# Patient Record
Sex: MALE | Race: WHITE | NOT HISPANIC OR LATINO | Employment: OTHER | URBAN - METROPOLITAN AREA
[De-identification: names, ages, dates, MRNs, and addresses within clinical notes are randomized per-mention and may not be internally consistent; named-entity substitution may affect disease eponyms.]

---

## 2019-01-17 ENCOUNTER — LAB VISIT (OUTPATIENT)
Dept: LAB | Facility: HOSPITAL | Age: 69
End: 2019-01-17
Attending: NURSE PRACTITIONER
Payer: MEDICARE

## 2019-01-17 DIAGNOSIS — Z12.11 SCREENING FOR COLORECTAL CANCER: ICD-10-CM

## 2019-01-17 DIAGNOSIS — Z12.12 SCREENING FOR COLORECTAL CANCER: ICD-10-CM

## 2019-01-17 LAB — HEMOCCULT STL QL IA: NEGATIVE

## 2019-01-17 PROCEDURE — 82274 ASSAY TEST FOR BLOOD FECAL: CPT

## 2019-04-16 PROBLEM — F17.200 SMOKER: Status: ACTIVE | Noted: 2019-04-16

## 2020-02-04 ENCOUNTER — PATIENT OUTREACH (OUTPATIENT)
Dept: ADMINISTRATIVE | Facility: HOSPITAL | Age: 70
End: 2020-02-04

## 2020-12-02 ENCOUNTER — LAB VISIT (OUTPATIENT)
Dept: LAB | Facility: HOSPITAL | Age: 70
End: 2020-12-02
Attending: NURSE PRACTITIONER
Payer: MEDICARE

## 2020-12-02 DIAGNOSIS — Z00.00 ENCOUNTER FOR PREVENTIVE HEALTH EXAMINATION: ICD-10-CM

## 2020-12-02 PROCEDURE — 82274 ASSAY TEST FOR BLOOD FECAL: CPT

## 2020-12-07 LAB — HEMOCCULT STL QL IA: NEGATIVE

## 2021-04-03 ENCOUNTER — HOSPITAL ENCOUNTER (EMERGENCY)
Facility: HOSPITAL | Age: 71
Discharge: HOME | End: 2021-04-03
Attending: FAMILY MEDICINE
Payer: MEDICARE

## 2021-04-03 VITALS
RESPIRATION RATE: 20 BRPM | BODY MASS INDEX: 37.5 KG/M2 | WEIGHT: 261.38 LBS | HEART RATE: 72 BPM | OXYGEN SATURATION: 98 % | TEMPERATURE: 97 F | SYSTOLIC BLOOD PRESSURE: 132 MMHG | DIASTOLIC BLOOD PRESSURE: 74 MMHG

## 2021-04-03 DIAGNOSIS — R06.02 SOB (SHORTNESS OF BREATH): ICD-10-CM

## 2021-04-03 DIAGNOSIS — Z20.822 COVID-19 VIRUS NOT DETECTED: ICD-10-CM

## 2021-04-03 DIAGNOSIS — F41.9 ANXIETY: Primary | ICD-10-CM

## 2021-04-03 LAB
ALBUMIN SERPL BCP-MCNC: 4 G/DL (ref 3.5–5.2)
ALP SERPL-CCNC: 95 U/L (ref 55–135)
ALT SERPL W/O P-5'-P-CCNC: 43 U/L (ref 10–44)
ANION GAP SERPL CALC-SCNC: 11 MMOL/L (ref 8–16)
AST SERPL-CCNC: 26 U/L (ref 10–40)
BASOPHILS # BLD AUTO: 0.06 K/UL (ref 0–0.2)
BASOPHILS NFR BLD: 0.5 % (ref 0–1.9)
BILIRUB SERPL-MCNC: 0.6 MG/DL (ref 0.1–1)
BNP SERPL-MCNC: 29 PG/ML (ref 0–99)
BUN SERPL-MCNC: 13 MG/DL (ref 8–23)
CALCIUM SERPL-MCNC: 9 MG/DL (ref 8.7–10.5)
CHLORIDE SERPL-SCNC: 103 MMOL/L (ref 95–110)
CO2 SERPL-SCNC: 20 MMOL/L (ref 23–29)
CREAT SERPL-MCNC: 1 MG/DL (ref 0.5–1.4)
DIFFERENTIAL METHOD: ABNORMAL
EOSINOPHIL # BLD AUTO: 0.2 K/UL (ref 0–0.5)
EOSINOPHIL NFR BLD: 2 % (ref 0–8)
ERYTHROCYTE [DISTWIDTH] IN BLOOD BY AUTOMATED COUNT: 13.7 % (ref 11.5–14.5)
EST. GFR  (AFRICAN AMERICAN): >60 ML/MIN/1.73 M^2
EST. GFR  (NON AFRICAN AMERICAN): >60 ML/MIN/1.73 M^2
GLUCOSE SERPL-MCNC: 100 MG/DL (ref 70–110)
HCT VFR BLD AUTO: 45.4 % (ref 40–54)
HGB BLD-MCNC: 15.2 G/DL (ref 14–18)
IMM GRANULOCYTES # BLD AUTO: 0.05 K/UL (ref 0–0.04)
IMM GRANULOCYTES NFR BLD AUTO: 0.5 % (ref 0–0.5)
LYMPHOCYTES # BLD AUTO: 0.7 K/UL (ref 1–4.8)
LYMPHOCYTES NFR BLD: 6.7 % (ref 18–48)
MCH RBC QN AUTO: 29.7 PG (ref 27–31)
MCHC RBC AUTO-ENTMCNC: 33.5 G/DL (ref 32–36)
MCV RBC AUTO: 89 FL (ref 82–98)
MONOCYTES # BLD AUTO: 1 K/UL (ref 0.3–1)
MONOCYTES NFR BLD: 8.6 % (ref 4–15)
NEUTROPHILS # BLD AUTO: 9.1 K/UL (ref 1.8–7.7)
NEUTROPHILS NFR BLD: 81.7 % (ref 38–73)
NRBC BLD-RTO: 0 /100 WBC
PLATELET # BLD AUTO: 194 K/UL (ref 150–450)
PMV BLD AUTO: 9.2 FL (ref 9.2–12.9)
POTASSIUM SERPL-SCNC: 3.8 MMOL/L (ref 3.5–5.1)
PROT SERPL-MCNC: 7.3 G/DL (ref 6–8.4)
RBC # BLD AUTO: 5.12 M/UL (ref 4.6–6.2)
SARS-COV-2 RDRP RESP QL NAA+PROBE: NEGATIVE
SODIUM SERPL-SCNC: 134 MMOL/L (ref 136–145)
TROPONIN I SERPL DL<=0.01 NG/ML-MCNC: 0.05 NG/ML (ref 0–0.03)
TROPONIN I SERPL DL<=0.01 NG/ML-MCNC: 0.07 NG/ML (ref 0–0.03)
WBC # BLD AUTO: 11.08 K/UL (ref 3.9–12.7)

## 2021-04-03 PROCEDURE — 93010 ELECTROCARDIOGRAM REPORT: CPT | Mod: ,,, | Performed by: INTERNAL MEDICINE

## 2021-04-03 PROCEDURE — U0002 COVID-19 LAB TEST NON-CDC: HCPCS | Performed by: FAMILY MEDICINE

## 2021-04-03 PROCEDURE — 63600175 PHARM REV CODE 636 W HCPCS: Performed by: FAMILY MEDICINE

## 2021-04-03 PROCEDURE — 80053 COMPREHEN METABOLIC PANEL: CPT | Performed by: FAMILY MEDICINE

## 2021-04-03 PROCEDURE — 84484 ASSAY OF TROPONIN QUANT: CPT | Performed by: FAMILY MEDICINE

## 2021-04-03 PROCEDURE — 99285 EMERGENCY DEPT VISIT HI MDM: CPT | Mod: 25

## 2021-04-03 PROCEDURE — 25000003 PHARM REV CODE 250: Performed by: FAMILY MEDICINE

## 2021-04-03 PROCEDURE — 93005 ELECTROCARDIOGRAM TRACING: CPT

## 2021-04-03 PROCEDURE — 83880 ASSAY OF NATRIURETIC PEPTIDE: CPT | Performed by: FAMILY MEDICINE

## 2021-04-03 PROCEDURE — 96374 THER/PROPH/DIAG INJ IV PUSH: CPT

## 2021-04-03 PROCEDURE — 36415 COLL VENOUS BLD VENIPUNCTURE: CPT | Performed by: FAMILY MEDICINE

## 2021-04-03 PROCEDURE — 85025 COMPLETE CBC W/AUTO DIFF WBC: CPT | Performed by: FAMILY MEDICINE

## 2021-04-03 PROCEDURE — 96375 TX/PRO/DX INJ NEW DRUG ADDON: CPT

## 2021-04-03 PROCEDURE — 93010 EKG 12-LEAD: ICD-10-PCS | Mod: ,,, | Performed by: INTERNAL MEDICINE

## 2021-04-03 RX ORDER — METHYLPREDNISOLONE SOD SUCC 125 MG
125 VIAL (EA) INJECTION
Status: COMPLETED | OUTPATIENT
Start: 2021-04-03 | End: 2021-04-03

## 2021-04-03 RX ORDER — ASPIRIN 325 MG
325 TABLET ORAL
Status: COMPLETED | OUTPATIENT
Start: 2021-04-03 | End: 2021-04-03

## 2021-04-03 RX ORDER — ALBUTEROL SULFATE 90 UG/1
1-2 AEROSOL, METERED RESPIRATORY (INHALATION) EVERY 6 HOURS PRN
Qty: 18 G | Refills: 1 | Status: SHIPPED | OUTPATIENT
Start: 2021-04-03 | End: 2022-08-18

## 2021-04-03 RX ORDER — LORAZEPAM 2 MG/ML
2 INJECTION INTRAMUSCULAR
Status: COMPLETED | OUTPATIENT
Start: 2021-04-03 | End: 2021-04-03

## 2021-04-03 RX ORDER — ALPRAZOLAM 0.5 MG/1
0.5 TABLET ORAL NIGHTLY PRN
Qty: 15 TABLET | Refills: 0 | Status: SHIPPED | OUTPATIENT
Start: 2021-04-03 | End: 2021-08-19

## 2021-04-03 RX ADMIN — LORAZEPAM 2 MG: 2 INJECTION INTRAMUSCULAR; INTRAVENOUS at 01:04

## 2021-04-03 RX ADMIN — ASPIRIN 325 MG: 325 TABLET ORAL at 01:04

## 2021-04-03 RX ADMIN — METHYLPREDNISOLONE SODIUM SUCCINATE 125 MG: 125 INJECTION, POWDER, FOR SOLUTION INTRAMUSCULAR; INTRAVENOUS at 01:04

## 2021-07-01 ENCOUNTER — PATIENT MESSAGE (OUTPATIENT)
Dept: ADMINISTRATIVE | Facility: OTHER | Age: 71
End: 2021-07-01

## 2022-05-12 ENCOUNTER — HOSPITAL ENCOUNTER (EMERGENCY)
Facility: HOSPITAL | Age: 72
Discharge: HOME | End: 2022-05-12
Attending: STUDENT IN AN ORGANIZED HEALTH CARE EDUCATION/TRAINING PROGRAM
Payer: MEDICARE

## 2022-05-12 VITALS
BODY MASS INDEX: 38.66 KG/M2 | HEART RATE: 95 BPM | OXYGEN SATURATION: 96 % | TEMPERATURE: 97 F | DIASTOLIC BLOOD PRESSURE: 89 MMHG | SYSTOLIC BLOOD PRESSURE: 150 MMHG | RESPIRATION RATE: 20 BRPM | WEIGHT: 269.38 LBS

## 2022-05-12 DIAGNOSIS — M54.9 BACK PAIN, UNSPECIFIED BACK LOCATION, UNSPECIFIED BACK PAIN LATERALITY, UNSPECIFIED CHRONICITY: Primary | ICD-10-CM

## 2022-05-12 PROCEDURE — 25000003 PHARM REV CODE 250: Performed by: STUDENT IN AN ORGANIZED HEALTH CARE EDUCATION/TRAINING PROGRAM

## 2022-05-12 PROCEDURE — 96372 THER/PROPH/DIAG INJ SC/IM: CPT | Performed by: STUDENT IN AN ORGANIZED HEALTH CARE EDUCATION/TRAINING PROGRAM

## 2022-05-12 PROCEDURE — 99284 EMERGENCY DEPT VISIT MOD MDM: CPT | Mod: 25

## 2022-05-12 PROCEDURE — 63600175 PHARM REV CODE 636 W HCPCS: Performed by: STUDENT IN AN ORGANIZED HEALTH CARE EDUCATION/TRAINING PROGRAM

## 2022-05-12 RX ORDER — LIDOCAINE 50 MG/G
1 PATCH TOPICAL DAILY
Qty: 10 PATCH | Refills: 0 | Status: SHIPPED | OUTPATIENT
Start: 2022-05-12 | End: 2023-05-19

## 2022-05-12 RX ORDER — LIDOCAINE 50 MG/G
1 PATCH TOPICAL
Status: DISCONTINUED | OUTPATIENT
Start: 2022-05-12 | End: 2022-05-12 | Stop reason: HOSPADM

## 2022-05-12 RX ORDER — KETOROLAC TROMETHAMINE 30 MG/ML
15 INJECTION, SOLUTION INTRAMUSCULAR; INTRAVENOUS
Status: COMPLETED | OUTPATIENT
Start: 2022-05-12 | End: 2022-05-12

## 2022-05-12 RX ORDER — CYCLOBENZAPRINE HCL 10 MG
10 TABLET ORAL
Status: COMPLETED | OUTPATIENT
Start: 2022-05-12 | End: 2022-05-12

## 2022-05-12 RX ADMIN — CYCLOBENZAPRINE HYDROCHLORIDE 10 MG: 10 TABLET, FILM COATED ORAL at 09:05

## 2022-05-12 RX ADMIN — LIDOCAINE 1 PATCH: 50 PATCH TOPICAL at 09:05

## 2022-05-12 RX ADMIN — KETOROLAC TROMETHAMINE 15 MG: 30 INJECTION, SOLUTION INTRAMUSCULAR at 09:05

## 2022-05-12 NOTE — ED TRIAGE NOTES
71 y.o. male presents to ER Room/bed info not found   Chief Complaint   Patient presents with    Back Pain     Lower back pain X 3 days , denies trauma    . No acute distress noted.

## 2022-05-12 NOTE — DISCHARGE INSTRUCTIONS
Please follow up with your primary care physician within 2 days. Ensure that you review all lab work results and/or imaging results. If you have any questions about your discharge paperwork please call the Emergency Department.     Return to the Emergency Department for any numbness, tingling, weakness, worsening pain, fever, numbness to your groin, urinary or bowel incontinence or retention, or any new or worsening symptoms.     Thank you for visiting [facility], Department of Emergency Medicine. Please see the entirety of the educational materials provided. Please note that a visit to the emergency department does not substitute ongoing care from a primary medical provider or specialist. Please ensure to follow up as recommended. However, please return to the emergency department immediately if symptoms do not improve as discussed, symptoms worsen, new symptoms develop, difficulty in following up or for any of your concerns or issues. Please note on discharge you are acknowledging understanding and agreement on medical evaluation, management recommendations and follow up recommendations.

## 2022-05-12 NOTE — ED PROVIDER NOTES
"Encounter Date: 2022       History     Chief Complaint   Patient presents with    Back Pain     Lower back pain X 3 days , denies trauma      Mr. Kruse is a 71 year old man who presents today for low back pain for the past 3 days. Patient reports that he has had back pain "for years" but generally can manage symptoms with lidocaine patches. Patient describes pain as 10/10, sharp and tight. Patient denies radiation to legs, bladder issue, or point tenderness. It is exacerbated by standing and sitting and relieved by laying down. Minimal relief from lidocaine patch, patient states "it's better than nothing". 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 Patient denies radiation to legs, bladder issue, or point tenderness. Patient medical history significant for cataracts.           Review of patient's allergies indicates:  No Known Allergies  Past Medical History:   Diagnosis Date    Back pain     BPH (benign prostatic hypertrophy) with urinary obstruction 2015    Cataract     Enlargement (benign) of prostate     Hernia, hiatal     High cholesterol     Hypertension     Numbness in right leg     Renal cyst     Ruptured disk     Urinary incontinence 2015     Past Surgical History:   Procedure Laterality Date    COLONOSCOPY      LASER OF PROSTATE W/ GREEN LIGHT PVP  2015    MOUTH SURGERY      yag laser       Family History   Problem Relation Age of Onset    Hypertension Mother     Hypertension Father     No Known Problems Sister     No Known Problems Paternal Grandmother     No Known Problems Paternal Grandfather     No Known Problems Maternal Grandmother     No Known Problems Maternal Grandfather      Social History     Tobacco Use    Smoking status: Former Smoker     Packs/day: 0.25     Years: 30.00     Pack years: 7.50     Types: Cigarettes     Quit date: 2021     Years since quittin.7    Smokeless tobacco: Former User    " Tobacco comment: Pt states he is interested in quitting because of cost   Substance Use Topics    Alcohol use: Yes     Alcohol/week: 12.0 standard drinks     Types: 12 Cans of beer per week     Comment: occasional    Drug use: No     Review of Systems   Constitutional: Negative for fever.   HENT: Negative for sore throat.    Respiratory: Negative for shortness of breath.    Cardiovascular: Negative for chest pain.   Gastrointestinal: Negative for abdominal pain, diarrhea, nausea and vomiting.   Genitourinary: Negative for dysuria.   Musculoskeletal: Positive for back pain.   Skin: Negative for rash.   Neurological: Negative for weakness.   Hematological: Does not bruise/bleed easily.       Physical Exam     Initial Vitals [05/12/22 0922]   BP Pulse Resp Temp SpO2   (!) 150/89 95 20 97 °F (36.1 °C) 96 %      MAP       --         Physical Exam    Nursing note and vitals reviewed.  Constitutional: He appears well-developed.   Eyes: EOM are normal.   Neck:   Normal range of motion.  Cardiovascular:   No murmur heard.  Pulmonary/Chest: No respiratory distress.   Abdominal: He exhibits no distension.   Musculoskeletal:         General: Normal range of motion.      Cervical back: Normal range of motion.      Comments: No point tenderness ilicited to palpation of lumbar and thoracic/lumbar spinous processes, transverse processes, or paraspinal musculature. Strength 5/5 and sensation appropriate in lower extremities. Lower extremity reflexes intact.       Neurological: He is alert.   Skin: Skin is warm.   Psychiatric: He has a normal mood and affect.         ED Course   Procedures  Labs Reviewed - No data to display       Imaging Results          X-Ray Lumbar Spine Ap And Lateral (Final result)  Result time 05/12/22 09:58:10    Final result by Ledy Angulo MD (05/12/22 09:58:10)                 Impression:      As above.      Electronically signed by: Ledy Angulo MD  Date:    05/12/2022  Time:    09:58              Narrative:    EXAMINATION:  XR LUMBAR SPINE AP AND LATERAL    CLINICAL HISTORY:  lower back pain;    TECHNIQUE:  AP, lateral and spot images were performed of the lumbar spine.    COMPARISON:  06/03/2013    FINDINGS:  Alignment: Slight straightening of the normal lumbar lordosis.    Vertebrae: Vertebral body heights are maintained.  No suspicious appearing lytic or blastic lesions.    Discs and facets: Multilevel disc space narrowing with endplate sclerosis and marginal osteophyte formation.  Prominent facet arthropathy at L4-5 and L5-S1.    Miscellaneous: Vascular calcifications.                                 Medications   LIDOcaine 5 % patch 1 patch (1 patch Transdermal Patch Applied 5/12/22 0957)   ketorolac injection 15 mg (15 mg Intramuscular Given 5/12/22 0957)   cyclobenzaprine tablet 10 mg (10 mg Oral Given 5/12/22 0956)     Medical Decision Making:   Differential Diagnosis:   DDX: Back pain - In review of history and physical there are no red flags for serious illness. There is no history of IVDU,  fever, chills, focal weakness, numbness, tingling,  symptoms, or immunocompromise.There is a normal neuro exam including equal strength and sensation. Patient is not elderly. Pain likely MSK in nature.  Will provide pain control, reassurance and reassess. Unlikely fracture given no midline TTP/stepoffs. Unlikely cauda equina given no neurological symptoms, urinary/bowel incontinence, or risk factors. Unlikely pyelonephritis or UTI as no CVAT, fever, or urinary symptoms. More likely contusion vs. muscle strain given history, exam.  DX: XR.  TX: Analgesia PRN.  DISPO: If symptoms controlled, likely discharge to follow up with PMD within 2 days with precautions for RTED and supportive care recommendations.                ED Course as of 05/12/22 1058   Thu May 12, 2022   1014 Patient reports significant improvement in pain.  Will discharge. [NB]      ED Course User Index  [NB] Fabio Myers MD              Clinical Impression:   Final diagnoses:  [M54.9] Back pain, unspecified back location, unspecified back pain laterality, unspecified chronicity (Primary)          ED Disposition Condition    Discharge Stable        ED Prescriptions     Medication Sig Dispense Start Date End Date Auth. Provider    LIDOcaine (LIDODERM) 5 % Place 1 patch onto the skin once daily. Remove & Discard patch within 12 hours or as directed by MD 10 patch 5/12/2022  Fabio Myers MD        Follow-up Information     Follow up With Specialties Details Why Contact Info    Jannette Rojas NP Internal Medicine Schedule an appointment as soon as possible for a visit in 2 days  [address] [facility] [id number]  [phone]      [facility] - Emergency Dept Emergency Medicine  If symptoms worsen [address] [facility] [address]  [phone]           Fabio Myers MD  05/12/22 1001       Fabio Myers MD  05/12/22 1055

## 2024-03-20 ENCOUNTER — OFFICE VISIT (OUTPATIENT)
Dept: CARDIOLOGY | Facility: CLINIC | Age: 74
End: 2024-03-20
Payer: MEDICARE

## 2024-03-20 VITALS
BODY MASS INDEX: 40.66 KG/M2 | HEIGHT: 70 IN | WEIGHT: 284 LBS | HEART RATE: 70 BPM | SYSTOLIC BLOOD PRESSURE: 134 MMHG | DIASTOLIC BLOOD PRESSURE: 80 MMHG

## 2024-03-20 DIAGNOSIS — I10 PRIMARY HYPERTENSION: Chronic | ICD-10-CM

## 2024-03-20 DIAGNOSIS — I73.9 PAD (PERIPHERAL ARTERY DISEASE): Primary | ICD-10-CM

## 2024-03-20 DIAGNOSIS — E78.2 MIXED HYPERLIPIDEMIA: ICD-10-CM

## 2024-03-20 DIAGNOSIS — E66.01 SEVERE OBESITY (BMI 35.0-39.9) WITH COMORBIDITY: ICD-10-CM

## 2024-03-20 PROCEDURE — 99999 PR PBB SHADOW E&M-EST. PATIENT-LVL IV: CPT | Mod: PBBFAC,,, | Performed by: INTERNAL MEDICINE

## 2024-03-20 PROCEDURE — 99204 OFFICE O/P NEW MOD 45 MIN: CPT | Mod: S$GLB,,, | Performed by: INTERNAL MEDICINE

## 2024-03-20 PROCEDURE — 93000 ELECTROCARDIOGRAM COMPLETE: CPT | Mod: S$GLB,,, | Performed by: INTERNAL MEDICINE

## 2024-03-20 NOTE — PROGRESS NOTES
[facility] Cardiology     Subjective:    Patient ID:  John Kruse is a 73 y.o. male who presents for evaluation of Hypertension, Hyperlipidemia, and Peripheral Arterial Disease    Review of patient's allergies indicates:  No Known Allergies   He has developed worsening pain in his legs around his knees bilaterally.  The location moves around a bit and each leg bothers him worse from time to time on an individual occurrence basis.  He states he has gained a lot of weight.  He is here because he has never had a cardiac assessment and was worried about vascular disease.    He has had hypertension therapy since age 55.  He is on statin therapy with LDL 62 mg%.  Of note he has HDL 28 triglycerides 112 mg%.  He does take Omega fish oil therapy.  He does not measure his blood pressure is currently.  Some readings appear elevated in the 130s at doctor visits.    His leg pains initiate 2 steps into walking.  The longer he walks the more there is pain.  He is concerned that it is due to his weight gain.  He states he can not get his shoes on by himself anymore because of his belly.  He does cut the grass and does some activities outside.  He has not had any change in exercise tolerance.  He is compliant with medications.  He quit smoking in 2021.  He had a negative abdominal ultrasound for aneurysm or athero sclerosis in 2018.    Today's electrocardiogram reviewed and independently interpreted by myself confirms sinus rhythm heart rate 70 normal tracing.          Review of Systems   Constitutional: Positive for weight gain. Negative for chills, decreased appetite, diaphoresis, fever, malaise/fatigue, night sweats and weight loss.   HENT:  Negative for congestion, ear discharge, ear pain, hearing loss, hoarse voice, nosebleeds, odynophagia, sore throat, stridor and tinnitus.    Eyes:  Negative for blurred vision, discharge, double vision, pain, photophobia,  redness, vision loss in left eye, vision loss in right eye, visual disturbance and visual halos.   Cardiovascular:  Negative for chest pain, claudication, cyanosis, dyspnea on exertion, irregular heartbeat, leg swelling, near-syncope, orthopnea, palpitations, paroxysmal nocturnal dyspnea and syncope.   Respiratory:  Negative for cough, hemoptysis, shortness of breath, sleep disturbances due to breathing, snoring, sputum production and wheezing.    Endocrine: Negative for cold intolerance, heat intolerance, polydipsia, polyphagia and polyuria.   Hematologic/Lymphatic: Negative for adenopathy and bleeding problem. Does not bruise/bleed easily.   Skin:  Negative for color change, dry skin, flushing, itching, nail changes, poor wound healing, rash, skin cancer, suspicious lesions and unusual hair distribution.   Musculoskeletal:  Positive for joint pain. Negative for arthritis, back pain, falls, gout, joint swelling, muscle cramps, muscle weakness, myalgias, neck pain and stiffness.   Gastrointestinal:  Negative for bloating, abdominal pain, anorexia, change in bowel habit, bowel incontinence, constipation, diarrhea, dysphagia, excessive appetite, flatus, heartburn, hematemesis, hematochezia, hemorrhoids, jaundice, melena, nausea and vomiting.   Genitourinary:  Negative for bladder incontinence, decreased libido, dysuria, flank pain, frequency, genital sores, hematuria, hesitancy, incomplete emptying, nocturia and urgency.   Neurological:  Negative for aphonia, brief paralysis, difficulty with concentration, disturbances in coordination, excessive daytime sleepiness, dizziness, focal weakness, headaches, light-headedness, loss of balance, numbness, paresthesias, seizures, sensory change, tremors, vertigo and weakness.   Psychiatric/Behavioral:  Negative for altered mental status, depression, hallucinations, memory loss, substance abuse, suicidal ideas and thoughts of violence. The patient does not have insomnia and is  "not nervous/anxious.    Allergic/Immunologic: Negative for hives and persistent infections.        Objective:       Vitals:    03/20/24 0811   BP: 134/80   BP Location: Right arm   Patient Position: Sitting   BP Method: X-Large (Automatic)   Pulse: 70   Weight: 128.8 kg (284 lb)   Height: 5' 10" (1.778 m)    Physical Exam  Constitutional:       General: He is not in acute distress.     Appearance: He is well-developed. He is not diaphoretic.   HENT:      Head: Normocephalic and atraumatic.      Nose: Nose normal.   Eyes:      General: No scleral icterus.        Right eye: No discharge.      Conjunctiva/sclera: Conjunctivae normal.      Pupils: Pupils are equal, round, and reactive to light.   Neck:      Thyroid: No thyromegaly.      Vascular: No JVD.      Trachea: No tracheal deviation.   Cardiovascular:      Rate and Rhythm: Normal rate and regular rhythm.      Pulses:           Carotid pulses are 2+ on the right side and 2+ on the left side.       Radial pulses are 2+ on the right side and 2+ on the left side.        Dorsalis pedis pulses are 2+ on the right side and 2+ on the left side.        Posterior tibial pulses are 2+ on the right side and 2+ on the left side.      Heart sounds: Normal heart sounds. No murmur heard.     No friction rub. No gallop.   Pulmonary:      Effort: Pulmonary effort is normal. No respiratory distress.      Breath sounds: Normal breath sounds. No stridor. No wheezing or rales.   Chest:      Chest wall: No tenderness.   Abdominal:      General: Bowel sounds are normal. There is no distension.      Palpations: Abdomen is soft. There is no mass.      Tenderness: There is no abdominal tenderness. There is no guarding or rebound.   Musculoskeletal:         General: No tenderness. Normal range of motion.      Cervical back: Normal range of motion and neck supple.   Lymphadenopathy:      Cervical: No cervical adenopathy.   Skin:     General: Skin is warm and dry.      Coloration: Skin is not " pale.      Findings: No erythema or rash.   Neurological:      Mental Status: He is alert and oriented to person, place, and time.      Cranial Nerves: No cranial nerve deficit.      Coordination: Coordination normal.   Psychiatric:         Behavior: Behavior normal.         Thought Content: Thought content normal.         Judgment: Judgment normal.           Assessment:       1. PAD (peripheral artery disease)    2. Severe obesity (BMI 35.0-39.9) with comorbidity    3. Primary hypertension    4. Mixed hyperlipidemia      Results for orders placed or performed in visit on 11/07/23   POCT URINE DIPSTICK WITHOUT MICROSCOPE   Result Value Ref Range    Color, UA Yellow     pH, UA 5.5     WBC, UA neg     Nitrite, UA neg     Protein, POC neg     Glucose, UA neg     Ketones, UA neg     Urobilinogen, UA 0.2     Bilirubin, POC neg     Blood, UA neg     Clarity, UA Clear     Spec Grav UA 1.025          Current Outpatient Medications:     albuterol (PROVENTIL HFA) 90 mcg/actuation inhaler, Inhale 2 puffs into the lungs every 6 (six) hours as needed for Wheezing. Rescue, Disp: 18 g, Rfl: 5    atorvastatin (LIPITOR) 40 MG tablet, TAKE 1 TABLET (40 MG TOTAL) BY MOUTH ONCE DAILY., Disp: 90 tablet, Rfl: 3    benazepriL (LOTENSIN) 20 MG tablet, TAKE 1 TABLET (20 MG TOTAL) BY MOUTH ONCE DAILY., Disp: 90 tablet, Rfl: 3    esomeprazole (NEXIUM) 40 MG capsule, TAKE 1 CAPSULE (40 MG TOTAL) BY MOUTH EVERY MORNING BEFORE BREAKFAST., Disp: 90 capsule, Rfl: 3    fish oil-omega-3 fatty acids 300-1,000 mg capsule, Take 2 g by mouth once daily., Disp: , Rfl:     fluticasone propionate (XHANCE NASL), by Nasal route., Disp: , Rfl:     latanoprost 0.005 % ophthalmic solution, Place 1 drop into both eyes once daily. INSTILL 1 DROP INTO EACH EYE IN THE EVENING, Disp: 5 mL, Rfl: 11    metoprolol tartrate (LOPRESSOR) 50 MG tablet, TAKE 1 TABLET (50 MG TOTAL) BY MOUTH 2 (TWO) TIMES DAILY., Disp: 180 tablet, Rfl: 3    multivitamin capsule, Take 1  capsule by mouth once daily., Disp: , Rfl:     mupirocin (BACTROBAN) 2 % ointment, APPLY OINTMENT TOPICALLY INTO EACH NOSTRIL THREE TIMES DAILY, Disp: , Rfl:     tamsulosin (FLOMAX) 0.4 mg Cap, Take 1 capsule (0.4 mg total) by mouth once daily., Disp: 90 capsule, Rfl: 3    tadalafiL (CIALIS) 20 MG Tab, TAKE 1 TABLET (20 MG TOTAL) BY MOUTH ONCE DAILY., Disp: 15 tablet, Rfl: 5     Lab Results   Component Value Date    WBC 6.16 08/14/2023    RBC 5.33 08/14/2023    HGB 15.5 08/14/2023    HCT 46.7 08/14/2023    MCV 88 08/14/2023    MCH 29.1 08/14/2023    MCHC 33.2 08/14/2023    RDW 14.2 08/14/2023     08/14/2023    MPV 9.9 08/14/2023    GRAN 3.7 08/14/2023    GRAN 59.7 08/14/2023    LYMPH 1.6 08/14/2023    LYMPH 26.0 08/14/2023    MONO 0.6 08/14/2023    MONO 9.6 08/14/2023    EOS 0.2 08/14/2023    BASO 0.03 08/14/2023    EOSINOPHIL 3.9 08/14/2023    BASOPHIL 0.5 08/14/2023        CMP  Lab Results   Component Value Date     08/14/2023    K 4.9 08/14/2023     08/14/2023    CO2 23 08/14/2023     08/14/2023    BUN 13 08/14/2023    CREATININE 0.8 08/14/2023    CALCIUM 10.4 08/14/2023    PROT 7.4 08/14/2023    ALBUMIN 4.1 08/14/2023    BILITOT 0.6 08/14/2023    ALKPHOS 89 08/14/2023    AST 22 08/14/2023    ALT 27 08/14/2023    ANIONGAP 9 08/14/2023    ESTGFRAFRICA >60.0 08/12/2021    EGFRNONAA >60.0 08/12/2021        Lab Results   Component Value Date    LABBLOO No growth after 5 days. 11/25/2020    LABURIN ESCHERICHIA COLI  >100,000 cfu/ml   (A) 11/01/2021            Results for orders placed or performed during the hospital encounter of 04/03/21   EKG 12-lead    Collection Time: 04/03/21 12:51 AM    Narrative    Test Reason : R06.02    Vent. Rate : 076 BPM     Atrial Rate : 076 BPM     P-R Int : 164 ms          QRS Dur : 090 ms      QT Int : 404 ms       P-R-T Axes : 048 064 054 degrees     QTc Int : 454 ms    Normal sinus rhythm  Normal ECG  When compared with ECG of 04-JAN-2018 12:28,  No  significant change was found  Confirmed by LUZ HUNT MD ([id number]) on 4/3/2021 8:46:20 AM    Referred By: [email or web address]   SELF           Confirmed By:LUZ HUNT MD                  Plan:       Problem List Items Addressed This Visit          Cardiac/Vascular    HTN (hypertension) (Chronic)     He monitors at home and states his blood pressures are stable.  Lotensin and Lopressor will be continued.  Condition stable.         HLD (hyperlipidemia)     Atorvastatin 40 mg utilized.  Current LDL 63 mg %, HDL 28, triglycerides 112 mg% by labs August 2023.  Condition controlled.         PAD (peripheral artery disease) - Primary     The patient was concerned about his leg pains being related to arterial disease of the lower extremities.  He has palpable pedal pulses.  He gets leg pains immediately upon walking which does not clinically fit with claudication.    IVONNE study ordered for completeness.         Relevant Orders    Ankle Brachial Indices (IVONNE)       Endocrine    Severe obesity (BMI 35.0-39.9) with comorbidity     He is trying to lose weight.  Weight loss encouraged.             I want him to record blood pressures and return in 2 months for further discussion of blood pressure management.  He has palpable pedal pulses.  IVONNE study ordered.    Thank you for allowing me to participate in your patient's care.                John Magana MD  03/20/2024   8:28 AM

## 2024-03-20 NOTE — ASSESSMENT & PLAN NOTE
Atorvastatin 40 mg utilized.  Current LDL 63 mg %, HDL 28, triglycerides 112 mg% by labs August 2023.  Condition controlled.

## 2024-03-20 NOTE — ASSESSMENT & PLAN NOTE
He monitors at home and states his blood pressures are stable.  Lotensin and Lopressor will be continued.  Condition stable.

## 2024-03-20 NOTE — ASSESSMENT & PLAN NOTE
The patient was concerned about his leg pains being related to arterial disease of the lower extremities.  He has palpable pedal pulses.  He gets leg pains immediately upon walking which does not clinically fit with claudication.    IVONNE study ordered for completeness.

## 2024-03-21 DIAGNOSIS — I10 PRIMARY HYPERTENSION: Primary | ICD-10-CM

## 2024-03-25 LAB
OHS QRS DURATION: 90 MS
OHS QTC CALCULATION: 436 MS

## 2024-03-27 ENCOUNTER — HOSPITAL ENCOUNTER (OUTPATIENT)
Dept: PULMONOLOGY | Facility: HOSPITAL | Age: 74
Discharge: HOME | End: 2024-03-27
Attending: INTERNAL MEDICINE
Payer: MEDICARE

## 2024-03-27 DIAGNOSIS — I73.9 PAD (PERIPHERAL ARTERY DISEASE): ICD-10-CM

## 2024-03-27 LAB
LEFT ABI: 1.12
LEFT ARM BP: 140 MMHG
LEFT DORSALIS PEDIS: 164 MMHG
LEFT POSTERIOR TIBIAL: 142 MMHG
LEFT TBI: 1.07
LEFT TOE PRESSURE: 157 MMHG
RIGHT ABI: 1.21
RIGHT ARM BP: 147 MMHG
RIGHT DORSALIS PEDIS: 178 MMHG
RIGHT POSTERIOR TIBIAL: 176 MMHG
RIGHT TBI: 1.03
RIGHT TOE PRESSURE: 151 MMHG

## 2024-03-27 PROCEDURE — 93922 UPR/L XTREMITY ART 2 LEVELS: CPT | Mod: 26,,, | Performed by: INTERNAL MEDICINE

## 2024-03-27 PROCEDURE — 93922 UPR/L XTREMITY ART 2 LEVELS: CPT

## 2024-03-28 ENCOUNTER — PATIENT MESSAGE (OUTPATIENT)
Dept: CARDIOLOGY | Facility: CLINIC | Age: 74
End: 2024-03-28
Payer: MEDICARE

## 2024-05-20 ENCOUNTER — OFFICE VISIT (OUTPATIENT)
Dept: ENDOCRINOLOGY | Facility: CLINIC | Age: 74
End: 2024-05-20
Payer: MEDICARE

## 2024-05-20 VITALS — HEIGHT: 70 IN | RESPIRATION RATE: 20 BRPM | BODY MASS INDEX: 39.13 KG/M2 | WEIGHT: 273.31 LBS | HEART RATE: 80 BPM

## 2024-05-20 DIAGNOSIS — E66.01 SEVERE OBESITY (BMI 35.0-39.9) WITH COMORBIDITY: ICD-10-CM

## 2024-05-20 DIAGNOSIS — G47.33 OSA (OBSTRUCTIVE SLEEP APNEA): ICD-10-CM

## 2024-05-20 DIAGNOSIS — R79.89 LOW TESTOSTERONE: Primary | ICD-10-CM

## 2024-05-20 DIAGNOSIS — E78.2 MIXED HYPERLIPIDEMIA: ICD-10-CM

## 2024-05-20 PROCEDURE — 3008F BODY MASS INDEX DOCD: CPT | Mod: CPTII,S$GLB,, | Performed by: STUDENT IN AN ORGANIZED HEALTH CARE EDUCATION/TRAINING PROGRAM

## 2024-05-20 PROCEDURE — 4010F ACE/ARB THERAPY RXD/TAKEN: CPT | Mod: CPTII,S$GLB,, | Performed by: STUDENT IN AN ORGANIZED HEALTH CARE EDUCATION/TRAINING PROGRAM

## 2024-05-20 PROCEDURE — 99999 PR PBB SHADOW E&M-EST. PATIENT-LVL III: CPT | Mod: PBBFAC,,, | Performed by: STUDENT IN AN ORGANIZED HEALTH CARE EDUCATION/TRAINING PROGRAM

## 2024-05-20 PROCEDURE — 1159F MED LIST DOCD IN RCRD: CPT | Mod: CPTII,S$GLB,, | Performed by: STUDENT IN AN ORGANIZED HEALTH CARE EDUCATION/TRAINING PROGRAM

## 2024-05-20 PROCEDURE — 99204 OFFICE O/P NEW MOD 45 MIN: CPT | Mod: S$GLB,,, | Performed by: STUDENT IN AN ORGANIZED HEALTH CARE EDUCATION/TRAINING PROGRAM

## 2024-05-20 PROCEDURE — G2211 COMPLEX E/M VISIT ADD ON: HCPCS | Mod: S$GLB,,, | Performed by: STUDENT IN AN ORGANIZED HEALTH CARE EDUCATION/TRAINING PROGRAM

## 2024-05-20 PROCEDURE — 1160F RVW MEDS BY RX/DR IN RCRD: CPT | Mod: CPTII,S$GLB,, | Performed by: STUDENT IN AN ORGANIZED HEALTH CARE EDUCATION/TRAINING PROGRAM

## 2024-05-20 NOTE — ASSESSMENT & PLAN NOTE
Only one morning value which is not diagnostic yet  Will repeat along with FSH, LH, PRL  Discussed risk factors including DIANA and BMI so, even if hypogonadism confirm, suggest continuing with weight loss first before considering something more long term such as TRT

## 2024-05-20 NOTE — ASSESSMENT & PLAN NOTE
Explained correlation with hypogonadism  He has lost 15lb in 1 month with mostly dietary changes and a very low dose GLP-1 RA  Encouraged to continue lifestyle changes and GLP-1 RA to be adjusted by PCP

## 2024-05-20 NOTE — PROGRESS NOTES
"Subjective:      Patient ID: John Kruse is a 73 y.o. male.    Chief Complaint:  Low testosterone    History of Present Illness  This is a 73 y.o. male. with a past medical history of HTN, HLD, PAD, DIANA on CPAP here for evaluation of low testosterone.    Hypogonadism  Diagnosed in 2023    Libido: Present  ED: Yes  Morning erections: No    Shaving frequency: No  Fatigue: Yes  Gynecomastia: No  Galactorrhea: No    He denies use of anabolic steroids    History of DIANA: Yes, on CPAP    4 biological children.     Not interested in fertility.     He is on Victoza 0.6 mg daily    Wt Readings from Last 6 Encounters:   05/20/24 124 kg (273 lb 4.8 oz)   04/17/24 130.6 kg (288 lb)   03/20/24 128.8 kg (284 lb)   11/07/23 124.1 kg (273 lb 9.5 oz)   10/17/23 125.4 kg (276 lb 6.4 oz)   05/19/23 125.8 kg (277 lb 5.4 oz)            Latest Reference Range & Units 08/14/23 07:12   Testosterone, Total 304 - 1227 ng/dL 291 (L)     Review of Systems  As above    Social and family history reviewed  Current medications and allergies reviewed    Objective:   BP (!) (P) 130/94   Pulse 80   Resp 20   Ht 5' 10" (1.778 m)   Wt 124 kg (273 lb 4.8 oz)   BMI 39.21 kg/m²   Physical Exam  Alert, oriented    BP Readings from Last 1 Encounters:   05/20/24 (!) (P) 130/94      Wt Readings from Last 1 Encounters:   05/20/24 1341 124 kg (273 lb 4.8 oz)     Body mass index is 39.21 kg/m².    Lab Review:   Lab Results   Component Value Date    HGBA1C 5.3 08/14/2023     Lab Results   Component Value Date    CHOL 113 (L) 08/14/2023    HDL 28 (L) 08/14/2023    LDLCALC 62.6 (L) 08/14/2023    TRIG 112 08/14/2023    CHOLHDL 24.8 08/14/2023     Lab Results   Component Value Date     08/14/2023    K 4.9 08/14/2023     08/14/2023    CO2 23 08/14/2023     08/14/2023    BUN 13 08/14/2023    CREATININE 0.8 08/14/2023    CALCIUM 10.4 08/14/2023    PROT 7.4 08/14/2023    ALBUMIN 4.1 08/14/2023    BILITOT 0.6 08/14/2023    ALKPHOS 89 08/14/2023    " AST 22 08/14/2023    ALT 27 08/14/2023    ANIONGAP 9 08/14/2023    ESTGFRAFRICA >60.0 08/12/2021    EGFRNONAA >60.0 08/12/2021    TSH 2.370 06/19/2018       All pertinent labs reviewed    Assessment and Plan     Low testosterone  Only one morning value which is not diagnostic yet  Will repeat along with FSH, LH, PRL  Discussed risk factors including DIANA and BMI so, even if hypogonadism confirm, suggest continuing with weight loss first before considering something more long term such as TRT    Severe obesity (BMI 35.0-39.9) with comorbidity  Explained correlation with hypogonadism  He has lost 15lb in 1 month with mostly dietary changes and a very low dose GLP-1 RA  Encouraged to continue lifestyle changes and GLP-1 RA to be adjusted by PCP    HLD (hyperlipidemia)  Continue statin  Followed by PCP and Cardiology    DIANA (obstructive sleep apnea)  Explained correlation between DIANA and hypogonadism  Weight loss as above  Continue CPAP      Loco Juarez MD  Endocrinology

## 2024-05-22 ENCOUNTER — OFFICE VISIT (OUTPATIENT)
Dept: CARDIOLOGY | Facility: CLINIC | Age: 74
End: 2024-05-22
Payer: MEDICARE

## 2024-05-22 VITALS
SYSTOLIC BLOOD PRESSURE: 120 MMHG | OXYGEN SATURATION: 95 % | HEIGHT: 70 IN | DIASTOLIC BLOOD PRESSURE: 74 MMHG | HEART RATE: 83 BPM | WEIGHT: 272 LBS | BODY MASS INDEX: 38.94 KG/M2

## 2024-05-22 DIAGNOSIS — I70.0 AORTIC ATHEROSCLEROSIS: ICD-10-CM

## 2024-05-22 DIAGNOSIS — E66.9 CLASS 2 OBESITY WITH BODY MASS INDEX (BMI) OF 39.0 TO 39.9 IN ADULT, UNSPECIFIED OBESITY TYPE, UNSPECIFIED WHETHER SERIOUS COMORBIDITY PRESENT: ICD-10-CM

## 2024-05-22 DIAGNOSIS — I10 PRIMARY HYPERTENSION: Chronic | ICD-10-CM

## 2024-05-22 DIAGNOSIS — E78.2 MIXED HYPERLIPIDEMIA: ICD-10-CM

## 2024-05-22 DIAGNOSIS — I73.9 PAD (PERIPHERAL ARTERY DISEASE): Primary | ICD-10-CM

## 2024-05-22 PROBLEM — E66.812 CLASS 2 OBESITY IN ADULT: Status: ACTIVE | Noted: 2024-05-22

## 2024-05-22 PROCEDURE — 1101F PT FALLS ASSESS-DOCD LE1/YR: CPT | Mod: CPTII,S$GLB,, | Performed by: INTERNAL MEDICINE

## 2024-05-22 PROCEDURE — 1160F RVW MEDS BY RX/DR IN RCRD: CPT | Mod: CPTII,S$GLB,, | Performed by: INTERNAL MEDICINE

## 2024-05-22 PROCEDURE — 3078F DIAST BP <80 MM HG: CPT | Mod: CPTII,S$GLB,, | Performed by: INTERNAL MEDICINE

## 2024-05-22 PROCEDURE — 1126F AMNT PAIN NOTED NONE PRSNT: CPT | Mod: CPTII,S$GLB,, | Performed by: INTERNAL MEDICINE

## 2024-05-22 PROCEDURE — 3288F FALL RISK ASSESSMENT DOCD: CPT | Mod: CPTII,S$GLB,, | Performed by: INTERNAL MEDICINE

## 2024-05-22 PROCEDURE — 1159F MED LIST DOCD IN RCRD: CPT | Mod: CPTII,S$GLB,, | Performed by: INTERNAL MEDICINE

## 2024-05-22 PROCEDURE — 3074F SYST BP LT 130 MM HG: CPT | Mod: CPTII,S$GLB,, | Performed by: INTERNAL MEDICINE

## 2024-05-22 PROCEDURE — 99214 OFFICE O/P EST MOD 30 MIN: CPT | Mod: S$GLB,,, | Performed by: INTERNAL MEDICINE

## 2024-05-22 PROCEDURE — 3008F BODY MASS INDEX DOCD: CPT | Mod: CPTII,S$GLB,, | Performed by: INTERNAL MEDICINE

## 2024-05-22 PROCEDURE — 99999 PR PBB SHADOW E&M-EST. PATIENT-LVL III: CPT | Mod: PBBFAC,,, | Performed by: INTERNAL MEDICINE

## 2024-05-22 PROCEDURE — 4010F ACE/ARB THERAPY RXD/TAKEN: CPT | Mod: CPTII,S$GLB,, | Performed by: INTERNAL MEDICINE

## 2024-05-22 NOTE — ASSESSMENT & PLAN NOTE
Current LDL 62 mg%, HDL 28, triglycerides 112 mg%.  Atorvastatin 40 mg, Omega fish oil therapy will be continued.  Condition stable.

## 2024-05-22 NOTE — PROGRESS NOTES
[facility]     Subjective:    Patient ID:  John Kruse is a 73 y.o. male who presents for follow-up of Hypertension and Hyperlipidemia    Review of patient's allergies indicates:  No Known Allergies   The patient was evaluated for peripheral arterial disease.  Based on exam findings there did not appear to be obstructive disease of the lower extremities.  IVONNE study completed-results normal.  He has DJD of the knees, likely trigger for his leg pains bilaterally.  He is a former smoker.  He quit 3 years ago.  He has hypertension, his blood pressure readings appear to be high at doctor's visits but today he is 120/74.  He is on Lotensin and Lopressor.  Most recent LDL 62 mg% utilizing atorvastatin.  He has lost weight with Victoza.  He cut his grass yesterday.  He had no difficulties breathing wise.        Review of Systems   Constitutional: Positive for weight loss. Negative for chills, decreased appetite, diaphoresis, fever, malaise/fatigue, night sweats and weight gain.   HENT:  Negative for congestion, ear discharge, ear pain, hearing loss, hoarse voice, nosebleeds, odynophagia, sore throat, stridor and tinnitus.    Eyes:  Negative for blurred vision, discharge, double vision, pain, photophobia, redness, vision loss in left eye, vision loss in right eye, visual disturbance and visual halos.   Cardiovascular:  Negative for chest pain, claudication, cyanosis, dyspnea on exertion, irregular heartbeat, leg swelling, near-syncope, orthopnea, palpitations, paroxysmal nocturnal dyspnea and syncope.   Respiratory:  Negative for cough, hemoptysis, shortness of breath, sleep disturbances due to breathing, snoring, sputum production and wheezing.    Endocrine: Negative for cold intolerance, heat intolerance, polydipsia, polyphagia and polyuria.   Hematologic/Lymphatic: Negative for adenopathy and bleeding problem. Does not bruise/bleed easily.  "  Skin:  Negative for color change, dry skin, flushing, itching, nail changes, poor wound healing, rash, skin cancer, suspicious lesions and unusual hair distribution.   Musculoskeletal:  Positive for joint pain. Negative for arthritis, back pain, falls, gout, joint swelling, muscle cramps, muscle weakness, myalgias, neck pain and stiffness.   Gastrointestinal:  Negative for bloating, abdominal pain, anorexia, change in bowel habit, bowel incontinence, constipation, diarrhea, dysphagia, excessive appetite, flatus, heartburn, hematemesis, hematochezia, hemorrhoids, jaundice, melena, nausea and vomiting.   Genitourinary:  Negative for bladder incontinence, decreased libido, dysuria, flank pain, frequency, genital sores, hematuria, hesitancy, incomplete emptying, nocturia and urgency.   Neurological:  Negative for aphonia, brief paralysis, difficulty with concentration, disturbances in coordination, excessive daytime sleepiness, dizziness, focal weakness, headaches, light-headedness, loss of balance, numbness, paresthesias, seizures, sensory change, tremors, vertigo and weakness.   Psychiatric/Behavioral:  Negative for altered mental status, depression, hallucinations, memory loss, substance abuse, suicidal ideas and thoughts of violence. The patient does not have insomnia and is not nervous/anxious.    Allergic/Immunologic: Negative for hives and persistent infections.        Objective:       Vitals:    05/22/24 0809   BP: 120/74   Pulse: 83   SpO2: 95%   Weight: 123.4 kg (272 lb)   Height: 5' 10" (1.778 m)    Physical Exam  Constitutional:       General: He is not in acute distress.     Appearance: He is well-developed. He is not diaphoretic.   HENT:      Head: Normocephalic and atraumatic.      Nose: Nose normal.   Eyes:      General: No scleral icterus.        Right eye: No discharge.      Conjunctiva/sclera: Conjunctivae normal.      Pupils: Pupils are equal, round, and reactive to light.   Neck:      Thyroid: No " thyromegaly.      Vascular: No JVD.      Trachea: No tracheal deviation.   Cardiovascular:      Rate and Rhythm: Normal rate and regular rhythm.      Pulses:           Carotid pulses are 2+ on the right side and 2+ on the left side.       Radial pulses are 2+ on the right side and 2+ on the left side.        Dorsalis pedis pulses are 2+ on the right side and 2+ on the left side.        Posterior tibial pulses are 2+ on the right side and 2+ on the left side.      Heart sounds: Normal heart sounds. No murmur heard.     No friction rub. No gallop.   Pulmonary:      Effort: Pulmonary effort is normal. No respiratory distress.      Breath sounds: Normal breath sounds. No stridor. No wheezing or rales.   Chest:      Chest wall: No tenderness.   Abdominal:      General: Bowel sounds are normal. There is no distension.      Palpations: Abdomen is soft. There is no mass.      Tenderness: There is no abdominal tenderness. There is no guarding or rebound.   Musculoskeletal:         General: No tenderness. Normal range of motion.      Cervical back: Normal range of motion and neck supple.   Lymphadenopathy:      Cervical: No cervical adenopathy.   Skin:     General: Skin is warm and dry.      Coloration: Skin is not pale.      Findings: No erythema or rash.   Neurological:      Mental Status: He is alert and oriented to person, place, and time.      Cranial Nerves: No cranial nerve deficit.      Coordination: Coordination normal.   Psychiatric:         Behavior: Behavior normal.         Thought Content: Thought content normal.         Judgment: Judgment normal.           Assessment:       1. PAD (peripheral artery disease)    2. Primary hypertension    3. Mixed hyperlipidemia    4. Aortic atherosclerosis    5. Class 2 obesity with body mass index (BMI) of 39.0 to 39.9 in adult, unspecified obesity type, unspecified whether serious comorbidity present      Results for orders placed or performed in visit on 04/17/24   [facility]  "Screening (Multitarget Stool DNA)    Specimen: Rectum; Stool   Result Value Ref Range    Cologuard Result Negative Negative         Current Outpatient Medications:     albuterol (PROVENTIL HFA) 90 mcg/actuation inhaler, Inhale 2 puffs into the lungs every 6 (six) hours as needed for Wheezing. Rescue, Disp: 18 g, Rfl: 5    atorvastatin (LIPITOR) 40 MG tablet, TAKE 1 TABLET (40 MG TOTAL) BY MOUTH ONCE DAILY., Disp: 90 tablet, Rfl: 3    benazepriL (LOTENSIN) 20 MG tablet, TAKE 1 TABLET (20 MG TOTAL) BY MOUTH ONCE DAILY., Disp: 90 tablet, Rfl: 3    esomeprazole (NEXIUM) 40 MG capsule, TAKE 1 CAPSULE (40 MG TOTAL) BY MOUTH EVERY MORNING BEFORE BREAKFAST., Disp: 90 capsule, Rfl: 3    fish oil-omega-3 fatty acids 300-1,000 mg capsule, Take 2 g by mouth once daily., Disp: , Rfl:     fluticasone propionate (XHANCE NASL), by Nasal route., Disp: , Rfl:     latanoprost 0.005 % ophthalmic solution, Place 1 drop into both eyes once daily. INSTILL 1 DROP INTO EACH EYE IN THE EVENING, Disp: 5 mL, Rfl: 11    liraglutide 0.6 mg/0.1 mL, 18 mg/3 mL, subq PNIJ (VICTOZA 2-URI) 0.6 mg/0.1 mL (18 mg/3 mL) PnIj pen, Inject 0.6 mg into the skin once daily., Disp: 6 mL, Rfl: 5    metoprolol tartrate (LOPRESSOR) 50 MG tablet, TAKE 1 TABLET (50 MG TOTAL) BY MOUTH 2 (TWO) TIMES DAILY., Disp: 180 tablet, Rfl: 3    multivitamin capsule, Take 1 capsule by mouth once daily., Disp: , Rfl:     mupirocin (BACTROBAN) 2 % ointment, APPLY OINTMENT TOPICALLY INTO EACH NOSTRIL THREE TIMES DAILY, Disp: , Rfl:     pen needle, diabetic 31 gauge x 1/4" Ndle, 1 each by Misc.(Non-Drug; Combo Route) route Daily., Disp: 100 each, Rfl: 3    tadalafiL (CIALIS) 20 MG Tab, TAKE 1 TABLET (20 MG TOTAL) BY MOUTH ONCE DAILY., Disp: 15 tablet, Rfl: 5    tamsulosin (FLOMAX) 0.4 mg Cap, Take 1 capsule (0.4 mg total) by mouth once daily., Disp: 90 capsule, Rfl: 3     Lab Results   Component Value Date    WBC 6.16 08/14/2023    RBC 5.33 08/14/2023    HGB 15.5 08/14/2023    HCT " 46.7 08/14/2023    MCV 88 08/14/2023    MCH 29.1 08/14/2023    MCHC 33.2 08/14/2023    RDW 14.2 08/14/2023     08/14/2023    MPV 9.9 08/14/2023    GRAN 3.7 08/14/2023    GRAN 59.7 08/14/2023    LYMPH 1.6 08/14/2023    LYMPH 26.0 08/14/2023    MONO 0.6 08/14/2023    MONO 9.6 08/14/2023    EOS 0.2 08/14/2023    BASO 0.03 08/14/2023    EOSINOPHIL 3.9 08/14/2023    BASOPHIL 0.5 08/14/2023        CMP  Lab Results   Component Value Date     08/14/2023    K 4.9 08/14/2023     08/14/2023    CO2 23 08/14/2023     08/14/2023    BUN 13 08/14/2023    CREATININE 0.8 08/14/2023    CALCIUM 10.4 08/14/2023    PROT 7.4 08/14/2023    ALBUMIN 4.1 08/14/2023    BILITOT 0.6 08/14/2023    ALKPHOS 89 08/14/2023    AST 22 08/14/2023    ALT 27 08/14/2023    ANIONGAP 9 08/14/2023    ESTGFRAFRICA >60.0 08/12/2021    EGFRNONAA >60.0 08/12/2021        Lab Results   Component Value Date    LABBLOO No growth after 5 days. 11/25/2020    LABURIN ESCHERICHIA COLI  >100,000 cfu/ml   (A) 11/01/2021            Results for orders placed or performed in visit on 03/20/24   EKG 12-lead    Collection Time: 03/20/24  8:05 AM   Result Value Ref Range    QRS Duration 90 ms    [facility]HS QTC Calculation 436 ms    Narrative    Test Reason : I10,    Vent. Rate : 070 BPM     Atrial Rate : 070 BPM     P-R Int : 160 ms          QRS Dur : 090 ms      QT Int : 404 ms       P-R-T Axes : 044 047 046 degrees     QTc Int : 436 ms    Normal sinus rhythm  Normal ECG  When compared with ECG of 03-APR-2021 00:51,  No significant change was found  Confirmed by John Magana MD ([id number]) on 3/25/2024 7:02:08 AM    Referred By: ORAL RIVAS           Confirmed By:John Magana MD                  Plan:       Problem List Items Addressed This Visit          Cardiac/Vascular    HTN (hypertension) (Chronic)     Current therapy will be continued.  Lotensin, Lopressor utilized.  Condition stable.         HLD (hyperlipidemia)     Current LDL 62 mg%, HDL 28,  triglycerides 112 mg%.  Atorvastatin 40 mg, Omega fish oil therapy will be continued.  Condition stable.         PAD (peripheral artery disease) - Primary     No supporting evidence by workup for lower extremity peripheral arterial disease as cause of leg pain.  IVONNE study normal.  Reassurance provided.         Aortic atherosclerosis     Condition stable.            Endocrine    Class 2 obesity in adult     Weight loss encouraged.               The patient can see me on an as needed basis.      No evidence of peripheral arterial disease of the lower extremities by exam findings or IVONNE.      We discussed coronary artery disease, he has never had a stress test.  He is asymptomatic.  I suggested he consider a calcium score in the future.               John Magana MD  05/22/2024   8:25 AM

## 2024-05-22 NOTE — ASSESSMENT & PLAN NOTE
No supporting evidence by workup for lower extremity peripheral arterial disease as cause of leg pain.  IVONNE study normal.  Reassurance provided.

## 2024-05-27 ENCOUNTER — PATIENT MESSAGE (OUTPATIENT)
Dept: ENDOCRINOLOGY | Facility: CLINIC | Age: 74
End: 2024-05-27
Payer: MEDICARE

## 2024-08-08 ENCOUNTER — HOSPITAL ENCOUNTER (OUTPATIENT)
Dept: RADIOLOGY | Facility: HOSPITAL | Age: 74
Discharge: HOME | End: 2024-08-08
Attending: PHYSICIAN ASSISTANT
Payer: MEDICARE

## 2024-08-08 DIAGNOSIS — M25.562 LEFT KNEE PAIN, UNSPECIFIED CHRONICITY: ICD-10-CM

## 2024-08-08 DIAGNOSIS — M25.562 LEFT KNEE PAIN, UNSPECIFIED CHRONICITY: Primary | ICD-10-CM

## 2024-08-08 PROCEDURE — 73564 X-RAY EXAM KNEE 4 OR MORE: CPT | Mod: TC,LT

## 2024-08-08 PROCEDURE — 73562 X-RAY EXAM OF KNEE 3: CPT | Mod: TC,RT

## 2024-08-08 PROCEDURE — 73562 X-RAY EXAM OF KNEE 3: CPT | Mod: 26,59,RT, | Performed by: RADIOLOGY

## 2024-08-08 PROCEDURE — 73564 X-RAY EXAM KNEE 4 OR MORE: CPT | Mod: 26,LT,, | Performed by: RADIOLOGY

## 2024-08-09 ENCOUNTER — OFFICE VISIT (OUTPATIENT)
Dept: ORTHOPEDICS | Facility: CLINIC | Age: 74
End: 2024-08-09
Payer: MEDICARE

## 2024-08-09 VITALS
WEIGHT: 260.63 LBS | RESPIRATION RATE: 16 BRPM | DIASTOLIC BLOOD PRESSURE: 78 MMHG | OXYGEN SATURATION: 94 % | HEART RATE: 77 BPM | BODY MASS INDEX: 37.31 KG/M2 | HEIGHT: 70 IN | SYSTOLIC BLOOD PRESSURE: 136 MMHG

## 2024-08-09 DIAGNOSIS — G89.29 CHRONIC PAIN OF LEFT KNEE: ICD-10-CM

## 2024-08-09 DIAGNOSIS — M25.562 CHRONIC PAIN OF LEFT KNEE: ICD-10-CM

## 2024-08-09 DIAGNOSIS — M17.12 PRIMARY OSTEOARTHRITIS OF LEFT KNEE: Primary | ICD-10-CM

## 2024-08-09 PROCEDURE — 99999 PR PBB SHADOW E&M-EST. PATIENT-LVL III: CPT | Mod: PBBFAC,,, | Performed by: PHYSICIAN ASSISTANT

## 2024-08-09 RX ORDER — TRIAMCINOLONE ACETONIDE 40 MG/ML
40 INJECTION, SUSPENSION INTRA-ARTICULAR; INTRAMUSCULAR ONCE
Status: COMPLETED | OUTPATIENT
Start: 2024-08-09 | End: 2024-08-09

## 2024-08-09 RX ADMIN — TRIAMCINOLONE ACETONIDE 40 MG: 40 INJECTION, SUSPENSION INTRA-ARTICULAR; INTRAMUSCULAR at 07:08

## 2024-09-04 ENCOUNTER — TELEPHONE (OUTPATIENT)
Dept: ORTHOPEDICS | Facility: CLINIC | Age: 74
End: 2024-09-04
Payer: MEDICARE

## 2024-09-04 NOTE — TELEPHONE ENCOUNTER
Called and spoke to pt wife, explained to wife that [facility] is out of clinic until next Tuesday. I offered to reschedule original appointment to come in and discuss. Pt wife rather wait for [facility] to return so I can discuss with her and then call PT back if he needed to come in or just placing the referral in for the gel injections. I explained that I will put a message in for [facility] and will call them back either Tuesday or Wednesday of next week with decision.             ----- Message from Patricia Vo sent at 2024  9:25 AM CDT -----  Contact: Patient  John Kruse  MRN: [id number]  : 1950  PCP: Jannette Rojas  Home Phone      [phone]  Work Phone      Not on file.  Mobile          [phone]  Home Phone      [phone]      MESSAGE: Patient states that the steroid shots did not help him so he would like to move forward with gel injections. His appointment is scheduled for . Please return this call.    PHONE; [phone]

## 2024-09-18 NOTE — PROGRESS NOTES
John Kruse is here for follow up of left knee arthritis. Pt is requesting Synvisc One injection.  PMHx reviewed per encounter record. Has failed other conservative modalities including NSAIDS, activity modification, weight loss.    PHYSICAL EXAMINATION:     General: The patient is alert and oriented x 3. Mood is pleasant.   Observation of ears, eyes and nose reveals no gross abnormalities. No   labored breathing observed.     No signs of infection or adverse reaction to knee.    Injection Procedure  A time out was performed, including verification of patient ID, procedure, site and side, availability of information and equipment, review of safety issues, and agreement with consent, the procedure site was marked.    After time out was performed, the patient was prepped aseptically with chloraprep. A diagnostic and therapeutic injection of 6cc Synvisc was given under sterile technique using a 22g x 1.5 needle from the Anterolateral  aspect of the left Knee Joint in the seated position.      John Kruse had no adverse reactions to the medication. He was instructed to apply ice to the joint for 20 minutes and avoid strenuous activities for 24-36 hours following the injection. He was warned of possible blood sugar and/or blood pressure changes during that time. Following that time, he can resume regular activities.    He was reminded to call the clinic immediately for any adverse side effects as explained in clinic today.    RTC in 6 weeks for follow up, sooner if needed.    Patient voices understanding of and agreement with treatment plan. All of the patient's questions were answered and the patient will contact us if they have any questions or concerns in the interim.

## 2024-09-19 ENCOUNTER — OFFICE VISIT (OUTPATIENT)
Dept: ORTHOPEDICS | Facility: CLINIC | Age: 74
End: 2024-09-19
Payer: MEDICARE

## 2024-09-19 VITALS
OXYGEN SATURATION: 94 % | RESPIRATION RATE: 18 BRPM | BODY MASS INDEX: 36.85 KG/M2 | WEIGHT: 257.38 LBS | HEART RATE: 74 BPM | HEIGHT: 70 IN

## 2024-09-19 DIAGNOSIS — M17.12 PRIMARY OSTEOARTHRITIS OF LEFT KNEE: Primary | ICD-10-CM

## 2024-09-19 PROCEDURE — 99999 PR PBB SHADOW E&M-EST. PATIENT-LVL IV: CPT | Mod: PBBFAC,,, | Performed by: PHYSICIAN ASSISTANT

## 2024-10-31 ENCOUNTER — OFFICE VISIT (OUTPATIENT)
Dept: ORTHOPEDICS | Facility: CLINIC | Age: 74
End: 2024-10-31
Payer: MEDICARE

## 2024-10-31 VITALS
BODY MASS INDEX: 36.65 KG/M2 | RESPIRATION RATE: 16 BRPM | DIASTOLIC BLOOD PRESSURE: 82 MMHG | HEIGHT: 70 IN | WEIGHT: 256 LBS | HEART RATE: 77 BPM | OXYGEN SATURATION: 96 % | SYSTOLIC BLOOD PRESSURE: 142 MMHG

## 2024-10-31 DIAGNOSIS — M25.562 CHRONIC PAIN OF LEFT KNEE: ICD-10-CM

## 2024-10-31 DIAGNOSIS — G89.29 CHRONIC PAIN OF LEFT KNEE: ICD-10-CM

## 2024-10-31 DIAGNOSIS — M17.12 PRIMARY OSTEOARTHRITIS OF LEFT KNEE: Primary | ICD-10-CM

## 2024-10-31 PROCEDURE — 1160F RVW MEDS BY RX/DR IN RCRD: CPT | Mod: CPTII,S$GLB,, | Performed by: PHYSICIAN ASSISTANT

## 2024-10-31 PROCEDURE — 1159F MED LIST DOCD IN RCRD: CPT | Mod: CPTII,S$GLB,, | Performed by: PHYSICIAN ASSISTANT

## 2024-10-31 PROCEDURE — 4010F ACE/ARB THERAPY RXD/TAKEN: CPT | Mod: CPTII,S$GLB,, | Performed by: PHYSICIAN ASSISTANT

## 2024-10-31 PROCEDURE — 1125F AMNT PAIN NOTED PAIN PRSNT: CPT | Mod: CPTII,S$GLB,, | Performed by: PHYSICIAN ASSISTANT

## 2024-10-31 PROCEDURE — 3079F DIAST BP 80-89 MM HG: CPT | Mod: CPTII,S$GLB,, | Performed by: PHYSICIAN ASSISTANT

## 2024-10-31 PROCEDURE — 99999 PR PBB SHADOW E&M-EST. PATIENT-LVL III: CPT | Mod: PBBFAC,,, | Performed by: PHYSICIAN ASSISTANT

## 2024-10-31 PROCEDURE — 3077F SYST BP >= 140 MM HG: CPT | Mod: CPTII,S$GLB,, | Performed by: PHYSICIAN ASSISTANT

## 2024-10-31 PROCEDURE — 3008F BODY MASS INDEX DOCD: CPT | Mod: CPTII,S$GLB,, | Performed by: PHYSICIAN ASSISTANT

## 2024-10-31 PROCEDURE — 99213 OFFICE O/P EST LOW 20 MIN: CPT | Mod: S$GLB,,, | Performed by: PHYSICIAN ASSISTANT

## 2024-11-26 ENCOUNTER — PATIENT MESSAGE (OUTPATIENT)
Dept: ADMINISTRATIVE | Facility: HOSPITAL | Age: 74
End: 2024-11-26
Payer: MEDICARE

## 2024-12-02 ENCOUNTER — PATIENT OUTREACH (OUTPATIENT)
Dept: ADMINISTRATIVE | Facility: HOSPITAL | Age: 74
End: 2024-12-02
Payer: MEDICARE

## 2024-12-02 VITALS — DIASTOLIC BLOOD PRESSURE: 65 MMHG | SYSTOLIC BLOOD PRESSURE: 131 MMHG

## 2024-12-02 NOTE — PROGRESS NOTES
Patient responded via patient portal in response to a BP bulk outreach with a remote BP reading 131/65

## 2025-03-13 ENCOUNTER — OFFICE VISIT (OUTPATIENT)
Dept: PAIN MEDICINE | Facility: CLINIC | Age: 75
End: 2025-03-13
Payer: MEDICARE

## 2025-03-13 ENCOUNTER — HOSPITAL ENCOUNTER (OUTPATIENT)
Dept: RADIOLOGY | Facility: HOSPITAL | Age: 75
Discharge: HOME | End: 2025-03-13
Attending: ANESTHESIOLOGY
Payer: MEDICARE

## 2025-03-13 ENCOUNTER — TELEPHONE (OUTPATIENT)
Dept: PAIN MEDICINE | Facility: CLINIC | Age: 75
End: 2025-03-13

## 2025-03-13 VITALS
DIASTOLIC BLOOD PRESSURE: 78 MMHG | HEART RATE: 92 BPM | BODY MASS INDEX: 36.79 KG/M2 | WEIGHT: 257 LBS | OXYGEN SATURATION: 92 % | HEIGHT: 70 IN | SYSTOLIC BLOOD PRESSURE: 141 MMHG

## 2025-03-13 DIAGNOSIS — M25.562 BILATERAL CHRONIC KNEE PAIN: ICD-10-CM

## 2025-03-13 DIAGNOSIS — M25.561 BILATERAL CHRONIC KNEE PAIN: Primary | ICD-10-CM

## 2025-03-13 DIAGNOSIS — G89.29 BILATERAL CHRONIC KNEE PAIN: Primary | ICD-10-CM

## 2025-03-13 DIAGNOSIS — G89.29 CHRONIC BILATERAL LOW BACK PAIN WITHOUT SCIATICA: ICD-10-CM

## 2025-03-13 DIAGNOSIS — M25.562 BILATERAL CHRONIC KNEE PAIN: Primary | ICD-10-CM

## 2025-03-13 DIAGNOSIS — M54.50 CHRONIC BILATERAL LOW BACK PAIN WITHOUT SCIATICA: ICD-10-CM

## 2025-03-13 DIAGNOSIS — M25.561 BILATERAL CHRONIC KNEE PAIN: ICD-10-CM

## 2025-03-13 DIAGNOSIS — M47.816 LUMBAR FACET ARTHROPATHY: ICD-10-CM

## 2025-03-13 DIAGNOSIS — G89.29 BILATERAL CHRONIC KNEE PAIN: ICD-10-CM

## 2025-03-13 PROCEDURE — 73564 X-RAY EXAM KNEE 4 OR MORE: CPT | Mod: 26,50,, | Performed by: RADIOLOGY

## 2025-03-13 PROCEDURE — 99999 PR PBB SHADOW E&M-EST. PATIENT-LVL III: CPT | Mod: PBBFAC,,, | Performed by: ANESTHESIOLOGY

## 2025-03-13 PROCEDURE — 73564 X-RAY EXAM KNEE 4 OR MORE: CPT | Mod: TC,50

## 2025-03-13 NOTE — H&P (VIEW-ONLY)
New Patient Evaluation  [facility]    John Kruse  : 1950  Date: 3/13/2025     CHIEF COMPLAINT:  Low-back Pain, Leg Pain, Knee Pain, and Hip Pain    Referring Physician: Cathy Mckenna, *  Primary Care Physician: Jannette Rojas NP    HPI:  This is a 74 y.o. male with a chief complaint of Low-back Pain, Leg Pain, Knee Pain, and Hip Pain  . The patient has Past medical history/Past surgical history of  hypertension, hyperlipidemia, peripheral artery disease, obstructive sleep apnea    Patient was evaluated and referred by Neurosurgery provider Cathy Mckenna MD  for low back pain and right lower extremity radiculitis.  MRI lumbar spine in  showed at L4-L5 disc protrusion with  mild-to-moderate lateral recess stenosis.  At L5-S1 right posterolateral disc bulge causing moderate right and mild left neural foramina narrowing.   Today he would like to address his knee pain that failed to respond to multiple steroid shot and  Synvisc.    Diabetic: No    Anticoagulation medications: None    Allergy To Iodine: No    Currently on Antibiotic: No    Current Description of Pain Symptoms:    History of Recent Fall or Trauma: No   Onset: Chronic, started 10 year ago   Pain Location: lower back   Radiates/associated symptoms: BL LE to the feet, in addition to bilateral knee pain.  Pain is Getting worse over the last 3 months    The pain is described as aching, numbing, and pulling .   Exacerbating factors: Standing and getting up from bed or a chair.   Mitigating factors N/A.   Symptoms interfere with daily activity, sleeping.   The patient feels like symptoms have been worsening.   Patient denies night fever/night sweats, urinary incontinence, bowel incontinence, significant weight loss, significant motor weakness, and loss of sensations.    Pain score:   Current: 2/10  Best: 2/10  Worst: 5/10    Current pain medications:  OTC    Current Narcotics/Opioid /benzo  Medications:  Opioids- None  Benzodiazepines: No    UDS:  NA    PDMP:  Reviewed and consistent with medication use as prescribed.     Previous Chronic Pain Treatment History:  Six weeks of conservative therapy include: Physical Therapy/HEP/Physician Lead Exercise Program:  Over the past 12 months, Patient has done  0 sessions.    Is patient actively participating in home exercise program (HEP)/ physician led exercise program in the last 6 months: Yes.    Non-interventional Pain Therapy:  [x]Chiropractor 2024  []Acupuncture/Dry needle.  []TENS unit.  [x]Heat/ICE.  []Back Brace.    Medications previously tried:  NSAIDs: OTC, Ibuprofen (Advil/Motrin), Meloxicam (Mobic), and Naproxen (Naprosyn)  Topical Agent: Yes  TCA/SSRI/SNRI: None  Anti-convulsants: None  Muscle Relaxants: Flexeril (Cyclobenzaprine) and Baclofen   Opioids- Oxycodone with Acetaminophen (Percocet) and Hydrocodone with Acetaminophen (Norco).    Interventional Pain Procedures:  Knee Injections: CSI and Synvisc ( eft knee)    Previous spine/Relevant joint surgery:  N/A  Surgical History:   has a past surgical history that includes Colonoscopy; Mouth surgery; Laser of prostate w/ green light pvp (07/2015); and yag laser.  Medical History:   has a past medical history of Back pain, BPH (benign prostatic hypertrophy) with urinary obstruction (06/26/2015), Cataract, Enlargement (benign) of prostate, Hernia, hiatal, High cholesterol, Numbness in right leg, Renal cyst, Ruptured disk, and Urinary incontinence (07/2015).  Family History:  family history includes Hypertension in his father and mother; No Known Problems in his maternal grandfather, maternal grandmother, paternal grandfather, paternal grandmother, and sister.  Allergies:  Patient has no known allergies.   Social History/SUBSTANCE ABUSE HISTORY:  Personal history of substance abuse: No   reports that he quit smoking about 3 years ago. His smoking use included cigarettes. He started smoking about 33  "years ago. He has a 7.5 pack-year smoking history. He has quit using smokeless tobacco. He reports current alcohol use of about 27.0 standard drinks of alcohol per week. He reports that he does not use drugs.  LABS:  CBC  Lab Results   Component Value Date    WBC 6.16 08/14/2023    HGB 15.5 08/14/2023    HCT 46.7 08/14/2023     Coagulation Profile   Lab Results   Component Value Date     08/14/2023       Lab Results   Component Value Date    INR 1.0 06/03/2021     CMP:  BMP  Lab Results   Component Value Date     08/14/2023    K 4.9 08/14/2023     08/14/2023    CO2 23 08/14/2023    BUN 13 08/14/2023    CREATININE 0.8 08/14/2023    CALCIUM 10.4 08/14/2023    ANIONGAP 9 08/14/2023    EGFRNORACEVR >60.0 08/14/2023     Lab Results   Component Value Date    ALT 27 08/14/2023    AST 22 08/14/2023    ALKPHOS 89 08/14/2023    BILITOT 0.6 08/14/2023     HGBA1C:  Lab Results   Component Value Date    HGBA1C 5.3 08/14/2023       ROS:    Review of Systems   GENERAL:  No weight loss, malaise or fevers.  HEENT:   No recent changes in vision or hearing  NECK:  Negative for lumps, no difficulty with swallowing.  RESPIRATORY:  Negative for cough, wheezing or shortness of breath, patient denies any recent URI.  CARDIOVASCULAR:  Negative for chest pain or palpitations.  GI:  Negative for abdominal discomfort, blood in stools or black stools or change in bowel habits.  MUSCULOSKELETAL:  See HPI.  SKIN:  Negative for lesions, rash, and itching.  PSYCH:  No mood disorder or recent psychosocial stressors.   HEMATOLOGY/LYMPHOLOGY:  See the blood thinner sectioned in HPI.  NEURO:  See HPI  All other reviewed and negative other than HPI.    PHYSICAL EXAM:  VITALS: BP (!) 141/78 (BP Location: Right arm, Patient Position: Sitting)   Pulse 92   Ht 5' 10" (1.778 m)   Wt 116.6 kg (257 lb)   SpO2 (!) 92%   BMI 36.88 kg/m²   Body mass index is 36.88 kg/m².  GENERAL: Well appearing, in no acute distress, alert and oriented " x3, answers questions appropriately.   PSYCH: Flat affect.  SKIN: Skin color, texture, turgor normal, no rashes or lesions.  HEAD/FACE:  Normocephalic, atraumatic. Cranial nerves grossly intact.  CV: Regular rate  PULM: No evidence of respiratory difficulty, symmetric chest rise.  GI:  Soft and non-Distended.  BACK/SIJ/HIP:  Lumbar Spine Exam:       Inspection: No erythema, bruising.       Palpation: (++) TTP of lumbar paraspinals bilaterally      ROM:  Limited in flexion, extension, lateral bending.   Knee exam:  No gross deformity or apparent leg length discrepancy, no tenderness over the anteromedial aspect of the knee cap, no limitation due to pain in flexion and extension, sensation  grossly intact to light touch in bilateral lower extremity, no atrophy or tone abnormalities    GAIT:  normal gait    DIAGNOSTIC STUDIES AND MEDICAL RECORDS REVIEW:  I have personally reviewed and interpreted relevant radiology reports and reviewed relevant records from other services in the EMR.     Anatomic lumbar alignment.  Desiccation L1-2 through L5-S1 discs.  L2-3, mild annular bulging.    L3-4, mild annular bulging.    L4-5, posterior midline disc protrusion with mild to moderate deformity adjacent ventral thecal sac and with underlying mild bulging of the disc.    L5-S1, bulging of the disc asymmetric to the right posterolaterally and laterally with secondary mild to moderate right and mild left foraminal narrowing.  Mild left facet arthropathy.    No disc herniation, canal stenosis or foraminal compromise remaining lumbar or visualized lower most thoracic disc levels.  The conus medullaris has a normal contour and signal.    Incidental vertebral hemangioma L4.  Marrow degenerative change at several levels particularly noted L2-3.    Probable bilateral renal cysts incompletely included or evaluated on this exam.   Impression    Posterior midline disc protrusion L4-5 with mild to moderate impression on adjacent ventral  thecal sac and underlying mild bulging of the disc.    Bulging asymmetric to the right posterolaterally and laterally L5-S1 disc with mild to moderate foraminal narrowing and mild left facet arthropathy.    Mild annular bulging L3-4.    Bilateral probable renal cyst incompletely included or evaluated on this exam.    -  x-ray bilateral knee:    Right: No fracture or dislocation.  No joint effusion.  Cartilage spaces are maintained.  Minimal dorsal spurring from the patella.     Left: No fracture or dislocation.  No joint effusion.  Medial compartment joint space narrowing with subchondral sclerosis.  Dorsal spurring the patella.  Clinical Impression:  This is a pleasant 74 y.o. male patient with PMH/PSH of hypertension, hyperlipidemia, peripheral artery disease, obstructive sleep apnea, presenting with back pain and bilateral knee pain, left worse than the right, he was referred from Neurosurgery to address his back pain, patient is not candidate for surgery, he reported significant improvement back pain after taking omegaXL, he does not have significant back pain anymore, continues to have bilateral knee pain( worse with standing and walking no pain on sitting) worse on the left, x-ray bilateral knee show medial compartment joint space narrowing with a sclerosis of the left knee, he had steroid injection and Synvisc with limited relief, he is interested in proceeding with bilateral genicular nerve block and possible ablation.     Encounter Diagnosis:  John Kruse is a 74 y.o. male with the following diagnoses based on history, exam, and imagin. Bilateral chronic knee pain  - X-ray Knee Ortho Bilateral with Flexion; Future    2. Lumbar facet arthropathy    3. Chronic bilateral low back pain without sciatica     Treatment Plan:    Diagnostics/Referrals: X-ray bilateral knee    Medications:    NSAIDs: OTC  Topical Agent: Yes  TCA/SSRI/SNRI: None  Anti-convulsants: None  Muscle Relaxants: None  Opioids:  None    Interventional Therapy: Please schedule for  Bilateral Genicular nerve block.   Sedation: IV Sedation.  Anticoagulation medications: None -Clearance to stop Blood thinner: NA    Regarding the above interventions, the patient has been educated regarding the risks (including bleeding, infection, increased pain, nerve damage, or allergic reaction), benefits, and alternatives. The patient states he understands and is eager to proceed.    Physical Rehabilitation: Physician led exercise program and home exercise    Patient Education: Counseled patient regarding the importance of activity modification, I have stressed the importance of physical activity and a home exercise plan to help with pain and improve health.    Follow-up: RTC  after ablation.    May consider: NA    I would like to thank Cathy Mckenna, * for the opportunity to assist in the care of this patient. We had a very nice visit and I look forward to continuing their care. Please let me know if I can be of further assistance.     Shakira Madrigal MD  Anesthesiologist  Interventional Pain Medicine  03/13/2025    Disclaimer:  This note was prepared using voice recognition system and is likely to have sound alike errors that may have been overlooked even after proof reading.  Please call me with any questions.

## 2025-03-13 NOTE — TELEPHONE ENCOUNTER
----- Message from Shakira Madrigal MD sent at 3/13/2025  2:43 PM CDT -----  · Interventional Therapy: Please schedule for  Bilateral Genicular nerve block. · Sedation: IV Sedation.· Anticoagulation medications: None -Clearance to stop Blood thinner: NA

## 2025-03-13 NOTE — PROGRESS NOTES
New Patient Evaluation  [facility]    John Kruse  : 1950  Date: 3/13/2025     CHIEF COMPLAINT:  No chief complaint on file.    Referring Physician: Cathy Mckenna, *  Primary Care Physician: Jannette Rojas NP    HPI:  This is a 74 y.o. male with a chief complaint of No chief complaint on file.  . The patient has Past medical history/Past surgical history of ***    Patient was evaluated and referred by  Neurosurgery provider Cathy Mckenna MD  for low back pain and right lower extremity radiculitis  MRI lumbar spine in 2024 showed at L4-L5 disc protrusion with  mild-to-moderate lateral recess stenosis.  At L5-S1 reason right posterolateral disc bulge causing moderate right and mild left neural foramina narrowing      Diabetic: {GAYes/No/NA:14058}    {Anticoagulation medications:79248}    Allergy To Iodine: {GAYes/No/NA:53438}    Currently on Antibiotic: {GAYes/No/NA:30397}    Current Description of Pain Symptoms:    History of Recent Fall or Trauma: {GAYes/No/NA:23373}   Onset: Chronic, started ***  Pain Location: ***  Radiates/associated symptoms: ***.   Pain is Getting worse over the last *** months    The pain is described as {Desc; pain character:71416}.   Exacerbating factors: {Causes; Pain:92759}.   Mitigating factors ***.   Symptoms interfere with daily activity, sleeping, and ***.   The patient feels like symptoms have been {IUW:18432}.   Patient {Denies / Reports:57564} {RED FLAGS:33866}.    Pain score:   Current: {PAIN 0-10:02020}/10  Best: {PAIN 0-10:67365}/10  Worst: {PAIN 0-10:09776}/10    Current pain medications:  ***    Current Narcotics/Opioid /benzo Medications:  Opioids- {GAopioid:29387}  Benzodiazepines: {GAYes/No/NA:18730}    UDS:  NA    PDMP:  Reviewed and consistent with medication use as prescribed.     Previous Chronic Pain Treatment History:  Six weeks of conservative therapy include: Physical Therapy/HEP/Physician Lead  Exercise Program:  Over the past 12 months, Patient has done  *** sessions.  PT response: {PT response:58058} Helpful.   Dates of the PT sessions: ***, ***.  Is patient actively participating in home exercise program (HEP)/ physician led exercise program in the last 6 months: {GAYes/No/NA:81271}.    Non-interventional Pain Therapy:  [x]Chiropractor.   []Acupuncture/Dry needle.  []TENS unit.  []Heat/ICE.  []Back Brace.    Medications previously tried:  NSAIDs: {GANSIAD:85701}  Topical Agent: {GAYes/No/NA:38349}  TCA/SSRI/SNRI: {GATCA/SSRI/SNRI:35091}  Anti-convulsants: {GAAnticonvulsants:57480}  Muscle Relaxants: {GAmuscle Relaxant:87805}  Opioids- {GAopioid:09244}.    Interventional Pain Procedures:  ***    Previous spine/Relevant joint surgery:  ***  Surgical History:   has a past surgical history that includes Colonoscopy; Mouth surgery; Laser of prostate w/ green light pvp (07/2015); and yag laser.  Medical History:   has a past medical history of Back pain, BPH (benign prostatic hypertrophy) with urinary obstruction (06/26/2015), Cataract, Enlargement (benign) of prostate, Hernia, hiatal, High cholesterol, Numbness in right leg, Renal cyst, Ruptured disk, and Urinary incontinence (07/2015).  Family History:  family history includes Hypertension in his father and mother; No Known Problems in his maternal grandfather, maternal grandmother, paternal grandfather, paternal grandmother, and sister.  Allergies:  Patient has no known allergies.   Social History/SUBSTANCE ABUSE HISTORY:  Personal history of substance abuse: No   reports that he quit smoking about 3 years ago. His smoking use included cigarettes. He started smoking about 33 years ago. He has a 7.5 pack-year smoking history. He has quit using smokeless tobacco. He reports current alcohol use of about 27.0 standard drinks of alcohol per week. He reports that he does not use drugs.  LABS:  CBC  Lab Results   Component Value Date    WBC 6.16 08/14/2023    HGB  15.5 08/14/2023    HCT 46.7 08/14/2023     Coagulation Profile   Lab Results   Component Value Date     08/14/2023       Lab Results   Component Value Date    INR 1.0 06/03/2021     CMP:  BMP  Lab Results   Component Value Date     08/14/2023    K 4.9 08/14/2023     08/14/2023    CO2 23 08/14/2023    BUN 13 08/14/2023    CREATININE 0.8 08/14/2023    CALCIUM 10.4 08/14/2023    ANIONGAP 9 08/14/2023    EGFRNORACEVR >60.0 08/14/2023     Lab Results   Component Value Date    ALT 27 08/14/2023    AST 22 08/14/2023    ALKPHOS 89 08/14/2023    BILITOT 0.6 08/14/2023     HGBA1C:  Lab Results   Component Value Date    HGBA1C 5.3 08/14/2023       ROS:    Review of Systems   GENERAL:  No weight loss, malaise or fevers.  HEENT:   No recent changes in vision or hearing  NECK:  Negative for lumps, no difficulty with swallowing.  RESPIRATORY:  Negative for cough, wheezing or shortness of breath, patient denies any recent URI.  CARDIOVASCULAR:  Negative for chest pain or palpitations.  GI:  Negative for abdominal discomfort, blood in stools or black stools or change in bowel habits.  MUSCULOSKELETAL:  See HPI.  SKIN:  Negative for lesions, rash, and itching.  PSYCH:  No mood disorder or recent psychosocial stressors.   HEMATOLOGY/LYMPHOLOGY:  See the blood thinner sectioned in HPI.  NEURO:  See HPI  All other reviewed and negative other than HPI.    PHYSICAL EXAM:  VITALS: There were no vitals taken for this visit.  There is no height or weight on file to calculate BMI.  GENERAL: Well appearing, in no acute distress, alert and oriented x3, answers questions appropriately.   PSYCH: Flat affect.  SKIN: Skin color, texture, turgor normal, no rashes or lesions.  HEAD/FACE:  Normocephalic, atraumatic. Cranial nerves grossly intact.  CV: Regular rate  PULM: No evidence of respiratory difficulty, symmetric chest rise.  GI:  Soft and non-Distended.  NECK: ({GA+:53518}) pain to palpation over the cervical paraspinous  muscles. Spurling:{GA+:21985}. ({GA+:04445}) pain with neck flexion, extension, or lateral flexion, Muscle strength in RT UE ***/5 and Left UE ***/5, Hand  ***/5, left Hand ***/5  BACK/SIJ/HIP:  Lumbar Spine Exam:       Inspection: No erythema, bruising.       Palpation: ({GA+:67242}) TTP of lumbar paraspinals bilaterally      ROM:  Limited in flexion, extension, lateral bending.       ({GA+:41213}) Facet loading {GAHip:68456}      ({GA+:62469}) Straight Leg Raise, {GAHip:74742}      ({GA+:61206}) SUZIE, Tenderness over the PSIS, Yeoman test, {GAHip:10056}  Hip Exam:      Inspection: No gross deformity or apparent leg length discrepancy      Palpation:  No TTP to bilateral greater trochanteric bursas.       ROM:  *** limitation Due to pain in internal rotation, external rotation b/l  Neurologic Exam:     Alert. Speech is fluent and appropriate.     Strength: ***/5 in {GAHip:65292} hip flexion and knee extension     Sensation:  Grossly intact to light touch in bilateral lower extremities     Tone: No abnormality appreciated in bilateral lower extremities  Knee exam:  No gross deformity or apparent leg length discrepancy, positive tenderness over the anteromedial aspect of the knee cap, positive limitation due to pain in flexion and extension, sensation  grossly intact to light touch in bilateral lower extremity, no atrophy or tone abnormalities    GAIT: {GAgait:26341}    DIAGNOSTIC STUDIES AND MEDICAL RECORDS REVIEW:  I have personally reviewed and interpreted relevant radiology reports and reviewed relevant records from other services in the EMR.     Clinical Impression:  This is a pleasant 74 y.o. male patient with PMH/PSH of ***, presenting with***.     We discussed the underlying diagnoses and multiple treatment options including non-opioid medications, interventional procedures, physical therapy, home exercise, core muscle enhancement, and weight loss.  The risks and benefits of each treatment option  "were discussed and all questions were answered.      Encounter Diagnosis:  John Kruse is a 74 y.o. male with the following diagnoses based on history, exam, and imaging:  There are no diagnoses linked to this encounter.     Treatment Plan:    Diagnostics/Referrals: {gaimage:80176}    Medications:    NSAIDs: {GANSIAD:28922}  Topical Agent: {GAYes/No/NA:64740}  TCA/SSRI/SNRI: {GATCA/SSRI/SNRI:54196}  Anti-convulsants: {GAAnticonvulsants:87339}  Muscle Relaxants: {GAmuscle Relaxant:18519}  Opioids: {GAopioid:99077::"None"}  Patient was educated about the risk and benefit of chronic opioid therapy including dependency, addiction, diversion, and opioid hyperalgesia.  Interventional Therapy: Lumbar epidural steroid injection at L4-5 .  Sedation: {GAsedation:63786}.  {Anticoagulation medications:23193} -Clearance to stop Blood thinner: {GAYes/No/NA:66749}    Regarding the above interventions, the patient has been educated regarding the risks (including bleeding, infection, increased pain, nerve damage, or allergic reaction), benefits, and alternatives. The patient states he understands and is eager to proceed.    Physical Rehabilitation: {GAPT:07626}    Patient Education: Counseled patient regarding the importance of {:97034}, I have stressed the importance of physical activity and a home exercise plan to help with pain and improve health.    Follow-up: RTC ***.    May consider:     I would like to thank Cathy Mckenna * for the opportunity to assist in the care of this patient. We had a very nice visit and I look forward to continuing their care. Please let me know if I can be of further assistance.     Shakira Madrigal MD  Anesthesiologist  Interventional Pain Medicine  03/13/2025    Disclaimer:  This note was prepared using voice recognition system and is likely to have sound alike errors that may have been overlooked even after proof reading.  Please call me with any questions.    "

## 2025-03-28 ENCOUNTER — HOSPITAL ENCOUNTER (OUTPATIENT)
Dept: RADIOLOGY | Facility: HOSPITAL | Age: 75
Discharge: HOME | End: 2025-03-28
Attending: ANESTHESIOLOGY | Admitting: ANESTHESIOLOGY
Payer: MEDICARE

## 2025-03-28 ENCOUNTER — HOSPITAL ENCOUNTER (OUTPATIENT)
Facility: HOSPITAL | Age: 75
Discharge: HOME | End: 2025-03-28
Attending: ANESTHESIOLOGY | Admitting: ANESTHESIOLOGY
Payer: MEDICARE

## 2025-03-28 VITALS
TEMPERATURE: 98 F | RESPIRATION RATE: 15 BRPM | OXYGEN SATURATION: 99 % | SYSTOLIC BLOOD PRESSURE: 145 MMHG | HEART RATE: 68 BPM | DIASTOLIC BLOOD PRESSURE: 72 MMHG

## 2025-03-28 DIAGNOSIS — M25.561 BILATERAL CHRONIC KNEE PAIN: ICD-10-CM

## 2025-03-28 DIAGNOSIS — R52 PAIN: ICD-10-CM

## 2025-03-28 DIAGNOSIS — M25.562 CHRONIC PAIN OF BOTH KNEES: Primary | ICD-10-CM

## 2025-03-28 DIAGNOSIS — M25.562 BILATERAL CHRONIC KNEE PAIN: ICD-10-CM

## 2025-03-28 DIAGNOSIS — G89.29 CHRONIC PAIN OF BOTH KNEES: Primary | ICD-10-CM

## 2025-03-28 DIAGNOSIS — G89.29 BILATERAL CHRONIC KNEE PAIN: ICD-10-CM

## 2025-03-28 DIAGNOSIS — M25.561 CHRONIC PAIN OF BOTH KNEES: Primary | ICD-10-CM

## 2025-03-28 PROCEDURE — 63600175 PHARM REV CODE 636 W HCPCS: Performed by: ANESTHESIOLOGY

## 2025-03-28 PROCEDURE — 76000 FLUOROSCOPY <1 HR PHYS/QHP: CPT | Mod: TC

## 2025-03-28 PROCEDURE — 64454 NJX AA&/STRD GNCLR NRV BRNCH: CPT | Mod: 50,KX,, | Performed by: ANESTHESIOLOGY

## 2025-03-28 PROCEDURE — 64454 NJX AA&/STRD GNCLR NRV BRNCH: CPT | Mod: 50 | Performed by: ANESTHESIOLOGY

## 2025-03-28 RX ORDER — LIDOCAINE HYDROCHLORIDE 20 MG/ML
INJECTION, SOLUTION INFILTRATION; PERINEURAL
Status: DISCONTINUED | OUTPATIENT
Start: 2025-03-28 | End: 2025-03-28 | Stop reason: HOSPADM

## 2025-03-28 RX ORDER — MIDAZOLAM HYDROCHLORIDE 1 MG/ML
INJECTION INTRAMUSCULAR; INTRAVENOUS
Status: DISCONTINUED | OUTPATIENT
Start: 2025-03-28 | End: 2025-03-28 | Stop reason: HOSPADM

## 2025-03-28 RX ORDER — SODIUM CHLORIDE 9 MG/ML
500 INJECTION, SOLUTION INTRAVENOUS CONTINUOUS
Status: DISCONTINUED | OUTPATIENT
Start: 2025-03-28 | End: 2025-03-28 | Stop reason: HOSPADM

## 2025-03-28 RX ORDER — BUPIVACAINE HYDROCHLORIDE 5 MG/ML
INJECTION, SOLUTION EPIDURAL; INTRACAUDAL; PERINEURAL
Status: DISCONTINUED | OUTPATIENT
Start: 2025-03-28 | End: 2025-03-28 | Stop reason: HOSPADM

## 2025-03-28 NOTE — BRIEF OP NOTE
Discharge Note  Short Stay    Admit Date: 3/28/2025    Attending Physician: Shakira Madrigal    Discharge Physician: Shakira Madrigal    Discharge Date: 3/28/2025 10:00 AM    Procedure(s) (LRB):  GENICULAR NERVE BLOCK (Bilateral)    Final Diagnosis: Bilateral chronic knee pain [M25.561, M25.562, G89.29]    Disposition: Home or self care    Patient Instructions:   Current Discharge Medication List        CONTINUE these medications which have NOT CHANGED    Details   atorvastatin (LIPITOR) 40 MG tablet TAKE 1 TABLET (40 MG TOTAL) BY MOUTH ONCE DAILY.  Qty: 90 tablet, Refills: 3    Associated Diagnoses: Metabolic syndrome; PAD (peripheral artery disease)      benazepriL (LOTENSIN) 20 MG tablet TAKE 1 TABLET (20 MG TOTAL) BY MOUTH ONCE DAILY.  Qty: 90 tablet, Refills: 3    Comments: .  Associated Diagnoses: Essential hypertension      metoprolol tartrate (LOPRESSOR) 50 MG tablet TAKE 1 TABLET (50 MG TOTAL) BY MOUTH 2 (TWO) TIMES DAILY.  Qty: 180 tablet, Refills: 3    Comments: .  Associated Diagnoses: Essential hypertension      tadalafiL (CIALIS) 20 MG Tab TAKE 1 TABLET (20 MG TOTAL) BY MOUTH ONCE DAILY.  Qty: 15 tablet, Refills: 5    Associated Diagnoses: Erectile dysfunction, unspecified erectile dysfunction type      tamsulosin (FLOMAX) 0.4 mg Cap Take 1 capsule (0.4 mg total) by mouth once daily.  Qty: 90 capsule, Refills: 3    Associated Diagnoses: BPH with obstruction/lower urinary tract symptoms      albuterol (PROVENTIL/VENTOLIN HFA) 90 mcg/actuation inhaler INHALE 2 PUFFS BY MOUTH EVERY 6 HOURS AS NEEDED FOR WHEEZING. RESCUE  Qty: 7 g, Refills: 11    Associated Diagnoses: Mixed simple and mucopurulent chronic bronchitis      esomeprazole (NEXIUM) 40 MG capsule TAKE 1 CAPSULE (40 MG TOTAL) BY MOUTH EVERY MORNING BEFORE BREAKFAST.  Qty: 90 capsule, Refills: 3    Associated Diagnoses: Gastroesophageal reflux disease, unspecified whether esophagitis present      fish oil-omega-3 fatty acids 300-1,000 mg capsule Take 2 g  "by mouth once daily.      latanoprost 0.005 % ophthalmic solution Place 1 drop into both eyes once daily. 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  Qty: 6 mL, Refills: 5    Associated Diagnoses: Metabolic syndrome; Class 3 severe obesity due to excess calories with serious comorbidity and body mass index (BMI) of 40.0 to 44.9 in adult      multivitamin capsule Take 1 capsule by mouth once daily.      pen needle, diabetic 31 gauge x 1/4" Ndle 1 each by Misc.(Non-Drug; Combo Route) route Daily.  Qty: 100 each, Refills: 3    Associated Diagnoses: Metabolic syndrome; Class 3 severe obesity due to excess calories with serious comorbidity and body mass index (BMI) of 40.0 to 44.9 in adult             Discharge Diagnosis: Bilateral chronic knee pain [M25.561, M25.562, G89.29]  Condition on Discharge: Stable with no complications to procedure   Diet on Discharge: Same as before.  Activity: as per instruction sheet.  Discharge to: Home with a responsible adult.  Follow up: 2-4 weeks       Please call my office or pager at [phone] if experienced any weakness or loss of sensation, fever > 101.5, pain uncontrolled with oral medications, persistent nausea/vomiting/or diarrhea, redness or drainage from the incisions, or any other worrisome concerns. If physician on call was not reached or could not communicate with our office for any reason please go to the nearest emergency department.     Shakira Madrigal  03/28/2025    "

## 2025-03-28 NOTE — OP NOTE
Diagnostic Genicular Nerve Block under Fluoroscopic Guidance    The procedure, risks, benefits, and options were discussed with the patient. There are no contraindications to the procedure. The patent expressed understanding and agreed to the procedure. Informed written consent was obtained prior to the start of the procedure and can be found in the patient's chart.    PATIENT NAME: John Kruse   MRN: [id number]     DATE OF PROCEDURE: 03/28/2025    PROCEDURE: Diagnostic Bilateral Genicular Nerve Block under Fluoroscopic Guidance    PRE-OP DIAGNOSIS: Bilateral chronic knee pain [M25.561, M25.562, G89.29]    POST-OP DIAGNOSIS: Same    PHYSICIAN: Shakira Madrigal MD      MEDICATIONS INJECTED: Bupivacine 0.25%     LOCAL ANESTHETIC INJECTED: Xylocaine 2%     SEDATION: Versed 2mg and Fentanyl 0                                                                                                                                                                                     Conscious sedation ordered by M.D. Patient re-evaluation prior to administration of conscious sedation. No changes noted in patient's status from initial evaluation. The patient's vital signs were monitored by RN and patient remained hemodynamically stable throughout the procedure.  No case tracking events are documented in the log.    ESTIMATED BLOOD LOSS: None    COMPLICATIONS: None    INTERVAL HISTORY: Patient has clinical findings of chronic knee pain that is not relieved by other therapies.     TECHNIQUE: Time-out was performed to identify the patient and procedure to be performed. With the patient laying in a supine position, the surgical area was prepped and draped in the usual sterile fashion using ChloraPrep and a fenestrated drape. Three target sites including the superior lateral genicular nerve where the lateral femoral shaft meets the epicondyle, the superior medial genicular nerve where the medial femoral shaft meets the epicondyle, and the  inferior medial genicular nerve where the medial tibial shaft meets the epicondyle, were determined under fluoroscopy guidance. Skin anesthesia was achieved by injecting Lidocaine 2% over the injection sites. A 25 gauge, 3.5 inch spinal quinke needle was then advanced under fluoroscopy in the AP and lateral views into the positions of the geniculate nerves at each site. Once the needle tip position was confirmed on fluoroscopy, negative pressure was applied to confirm no intravascular placement.  1 mL of the anesthetic listed above was then slowly injected at each site. The needles were removed and bleeding was nil. A sterile dressing was applied. No specimens collected. The patient tolerated the procedure well.       The patient was monitored after the procedure in the recovery area. They were given post-procedure and discharge instructions to follow at home. The patient was discharged in a stable condition.    Shakira Madrigal MD

## 2025-04-10 ENCOUNTER — TELEPHONE (OUTPATIENT)
Dept: PAIN MEDICINE | Facility: CLINIC | Age: 75
End: 2025-04-10
Payer: MEDICARE

## 2025-04-10 NOTE — TELEPHONE ENCOUNTER
----- Message from Shakira Madrigal MD sent at 4/10/2025  7:29 AM CDT -----   Patient needs telephone  encounter for diagnostic block

## 2025-04-21 ENCOUNTER — TELEPHONE (OUTPATIENT)
Dept: PAIN MEDICINE | Facility: CLINIC | Age: 75
End: 2025-04-21
Payer: MEDICARE

## 2025-04-21 NOTE — TELEPHONE ENCOUNTER
Patient with bilat genicular NB on 3/28/25.  Patient reports pain level before inj 2/10  Pain after inj 1/10  States had about 40% relief, but is experiencing no pain in the right knee. He started with left knee pain again about 3-4 days ago after cutting grass. States it got to 4/10, and Is currently at 1-2/10.

## 2025-04-21 NOTE — TELEPHONE ENCOUNTER
----- Message from Shaniqua sent at 4/21/2025 11:22 AM CDT -----  Contact: PATIENT  John KruseMRN: [id number]: 1950PCP: Jannette Rojas.Home Phone      [phone] Phone      Not on file.Mobile          [phone] Phone      Not on file.MESSAGE: Patient states that he a procedure done on 03/28/25 and was told that he needed to be seen in 2 weeks but he has not heard from anyone to schedule the appointment.  He says that he is having severe pain & swelling to his left knee.Phone: [phone] or [phone]

## 2025-04-24 ENCOUNTER — OFFICE VISIT (OUTPATIENT)
Dept: PAIN MEDICINE | Facility: CLINIC | Age: 75
End: 2025-04-24
Payer: MEDICARE

## 2025-04-24 VITALS
WEIGHT: 258 LBS | SYSTOLIC BLOOD PRESSURE: 121 MMHG | HEART RATE: 79 BPM | HEIGHT: 70 IN | DIASTOLIC BLOOD PRESSURE: 82 MMHG | BODY MASS INDEX: 36.94 KG/M2 | OXYGEN SATURATION: 94 %

## 2025-04-24 DIAGNOSIS — M25.561 BILATERAL CHRONIC KNEE PAIN: Primary | ICD-10-CM

## 2025-04-24 DIAGNOSIS — G89.29 CHRONIC BILATERAL LOW BACK PAIN WITHOUT SCIATICA: ICD-10-CM

## 2025-04-24 DIAGNOSIS — M25.562 BILATERAL CHRONIC KNEE PAIN: Primary | ICD-10-CM

## 2025-04-24 DIAGNOSIS — G89.29 BILATERAL CHRONIC KNEE PAIN: Primary | ICD-10-CM

## 2025-04-24 DIAGNOSIS — M71.22 SYNOVIAL CYST OF LEFT POPLITEAL SPACE: ICD-10-CM

## 2025-04-24 DIAGNOSIS — M47.816 LUMBAR FACET ARTHROPATHY: ICD-10-CM

## 2025-04-24 DIAGNOSIS — M54.50 CHRONIC BILATERAL LOW BACK PAIN WITHOUT SCIATICA: ICD-10-CM

## 2025-04-24 PROCEDURE — 99999 PR PBB SHADOW E&M-EST. PATIENT-LVL IV: CPT | Mod: PBBFAC,,, | Performed by: ANESTHESIOLOGY

## 2025-04-24 NOTE — PROGRESS NOTES
EST Patient Evaluation  [facility]    John Kruse  : 1950  Date: 2025     CHIEF COMPLAINT:  Follow-up and Knee Pain    Referring Physician: No ref. provider found  Primary Care Physician: Jannette Rojas NP    HPI:  This is a 74 y.o. male with a chief complaint of Follow-up and Knee Pain  . The patient has Past medical history/Past surgical history of  hypertension, hyperlipidemia, peripheral artery disease, obstructive sleep apnea    Patient was evaluated and referred by Neurosurgery provider Cathy Mckenna MD  for low back pain and right lower extremity radiculitis.  MRI lumbar spine in  showed at L4-L5 disc protrusion with  mild-to-moderate lateral recess stenosis.  At L5-S1 right posterolateral disc bulge causing moderate right and mild left neural foramina narrowing.   Today he would like to address his knee pain that failed to respond to multiple steroid shot and  Synvisc.    Interval History 2025:  John Kruse is here for procedure follow up visit after right genicular nerve block,  patient reported 100 % improvement in pain and functionality,  in addition to left genicular nerve block,  patient reported 0 % improvement in pain and functionality. His left knee pain is worse than Right. No significant right knee pain.   Current pain score: 4/10    Diabetic: No    Anticoagulation medications: None    Allergy To Iodine: No    Currently on Antibiotic: No    Current Description of Pain Symptoms:    History of Recent Fall or Trauma: No   Onset: Chronic, started 10 year ago   Pain Location: lower back   Radiates/associated symptoms: BL LE to the feet, in addition to bilateral knee pain,  left worse than the right mainly in the posterior aspect  Pain is Getting worse over the last 3 months    The pain is described as aching, numbing, and pulling .   Exacerbating factors: Standing and getting up from bed or a chair.   Mitigating factors N/A.    Symptoms interfere with daily activity, sleeping.   The patient feels like symptoms have been worsening.   Patient denies night fever/night sweats, urinary incontinence, bowel incontinence, significant weight loss, significant motor weakness, and loss of sensations.    Pain score:   Best: 2/10  Worst: 5/10    Current pain medications:  OTC    Current Narcotics/Opioid /benzo Medications:  Opioids- None  Benzodiazepines: No    UDS:  NA    PDMP:  Reviewed and consistent with medication use as prescribed.     Previous Chronic Pain Treatment History:  Six weeks of conservative therapy include: Physical Therapy/HEP/Physician Lead Exercise Program:  Over the past 12 months, Patient has done  0 sessions.    Is patient actively participating in home exercise program (HEP)/ physician led exercise program in the last 6 months: Yes.    Non-interventional Pain Therapy:  [x]Chiropractor 2024  []Acupuncture/Dry needle.  []TENS unit.  [x]Heat/ICE.  []Back Brace.    Medications previously tried:  NSAIDs: OTC, Ibuprofen (Advil/Motrin), Meloxicam (Mobic), and Naproxen (Naprosyn)  Topical Agent: Yes  TCA/SSRI/SNRI: None  Anti-convulsants: None  Muscle Relaxants: Flexeril (Cyclobenzaprine) and Baclofen   Opioids- Oxycodone with Acetaminophen (Percocet) and Hydrocodone with Acetaminophen (Norco).    Interventional Pain Procedures:  Knee Injections: CSI and Synvisc ( left knee)  03/28/2025: Diagnostic Bilateral Genicular Nerve Block - 100% relief on right and 0% relief on left    Previous spine/Relevant joint surgery:  N/A  Surgical History:   has a past surgical history that includes Colonoscopy; Mouth surgery; Laser of prostate w/ green light pvp (07/2015); yag laser; and block, nerve, genicular (Bilateral, 3/28/2025).  Medical History:   has a past medical history of Back pain, BPH (benign prostatic hypertrophy) with urinary obstruction (06/26/2015), Cataract, Enlargement (benign) of prostate, Hernia, hiatal, High cholesterol,  Numbness in right leg, Renal cyst, Ruptured disk, and Urinary incontinence (07/2015).  Family History:  family history includes Hypertension in his father and mother; No Known Problems in his maternal grandfather, maternal grandmother, paternal grandfather, paternal grandmother, and sister.  Allergies:  Patient has no known allergies.   Social History/SUBSTANCE ABUSE HISTORY:  Personal history of substance abuse: No   reports that he quit smoking about 3 years ago. His smoking use included cigarettes. He started smoking about 33 years ago. He has a 7.5 pack-year smoking history. He has quit using smokeless tobacco. He reports current alcohol use of about 27.0 standard drinks of alcohol per week. He reports that he does not use drugs.  LABS:  CBC  Lab Results   Component Value Date    WBC 6.16 08/14/2023    HGB 15.5 08/14/2023    HCT 46.7 08/14/2023     Coagulation Profile   Lab Results   Component Value Date     08/14/2023       Lab Results   Component Value Date    INR 1.0 06/03/2021     CMP:  BMP  Lab Results   Component Value Date     08/14/2023    K 4.9 08/14/2023     08/14/2023    CO2 23 08/14/2023    BUN 13 08/14/2023    CREATININE 0.8 08/14/2023    CALCIUM 10.4 08/14/2023    ANIONGAP 9 08/14/2023    EGFRNORACEVR >60.0 08/14/2023     Lab Results   Component Value Date    ALT 27 08/14/2023    AST 22 08/14/2023    ALKPHOS 89 08/14/2023    BILITOT 0.6 08/14/2023     HGBA1C:  Lab Results   Component Value Date    HGBA1C 5.3 08/14/2023       ROS:    Review of Systems   GENERAL:  No weight loss, malaise or fevers.  HEENT:   No recent changes in vision or hearing  NECK:  Negative for lumps, no difficulty with swallowing.  RESPIRATORY:  Negative for cough, wheezing or shortness of breath, patient denies any recent URI.  CARDIOVASCULAR:  Negative for chest pain or palpitations.  GI:  Negative for abdominal discomfort, blood in stools or black stools or change in bowel habits.  MUSCULOSKELETAL:  See  "HPI.  SKIN:  Negative for lesions, rash, and itching.  PSYCH:  No mood disorder or recent psychosocial stressors.   HEMATOLOGY/LYMPHOLOGY:  See the blood thinner sectioned in HPI.  NEURO:  See HPI  All other reviewed and negative other than HPI.    PHYSICAL EXAM:  VITALS: /82 (BP Location: Left arm, Patient Position: Sitting)   Pulse 79   Ht 5' 10" (1.778 m)   Wt 117 kg (258 lb)   SpO2 (!) 94%   BMI 37.02 kg/m²   Body mass index is 37.02 kg/m².  GENERAL: Well appearing, in no acute distress, alert and oriented x3, answers questions appropriately.   PSYCH: Flat affect.  SKIN: Skin color, texture, turgor normal, no rashes or lesions.  HEAD/FACE:  Normocephalic, atraumatic. Cranial nerves grossly intact.  CV: Regular rate  PULM: No evidence of respiratory difficulty, symmetric chest rise.  GI:  Soft and non-Distended.  BACK/SIJ/HIP:  Lumbar Spine Exam:       Inspection: No erythema, bruising.       Palpation: (++) TTP of lumbar paraspinals bilaterally      ROM:  Limited in flexion, extension, lateral bending.   Knee exam:  No gross deformity or apparent leg length discrepancy, no tenderness over the anteromedial aspect of the knee cap, + limitation due to pain in flexion and extension, sensation grossly intact to light touch in bilateral lower extremity, no atrophy or tone abnormalities,+  swelling and tenderness in the posterior aspect of the left knee    GAIT:  normal gait    DIAGNOSTIC STUDIES AND MEDICAL RECORDS REVIEW:  I have personally reviewed and interpreted relevant radiology reports and reviewed relevant records from other services in the EMR.     Anatomic lumbar alignment.  Desiccation L1-2 through L5-S1 discs.  L2-3, mild annular bulging.    L3-4, mild annular bulging.    L4-5, posterior midline disc protrusion with mild to moderate deformity adjacent ventral thecal sac and with underlying mild bulging of the disc.    L5-S1, bulging of the disc asymmetric to the right posterolaterally and " laterally with secondary mild to moderate right and mild left foraminal narrowing.  Mild left facet arthropathy.    No disc herniation, canal stenosis or foraminal compromise remaining lumbar or visualized lower most thoracic disc levels.  The conus medullaris has a normal contour and signal.    Incidental vertebral hemangioma L4.  Marrow degenerative change at several levels particularly noted L2-3.    Probable bilateral renal cysts incompletely included or evaluated on this exam.   Impression    Posterior midline disc protrusion L4-5 with mild to moderate impression on adjacent ventral thecal sac and underlying mild bulging of the disc.    Bulging asymmetric to the right posterolaterally and laterally L5-S1 disc with mild to moderate foraminal narrowing and mild left facet arthropathy.    Mild annular bulging L3-4.    Bilateral probable renal cyst incompletely included or evaluated on this exam.    -  x-ray bilateral knee:    Right: No fracture or dislocation.  No joint effusion.  Cartilage spaces are maintained.  Minimal dorsal spurring from the patella.     Left: No fracture or dislocation.  No joint effusion.  Medial compartment joint space narrowing with subchondral sclerosis.  Dorsal spurring the patella.    Clinical Impression:  This is a pleasant 74 y.o. male patient with PMH/PSH of hypertension, hyperlipidemia, peripheral artery disease, obstructive sleep apnea, presenting with back pain and bilateral knee pain, left worse than the right, he was referred from Neurosurgery to address his back pain, patient is not candidate for surgery, he reported significant improvement back pain after taking omegaXL, he does not have significant back pain anymore, continues to have bilateral knee pain( worse with standing and walking no pain on sitting) worse on the left, x-ray bilateral knee show medial compartment joint space narrowing with a sclerosis of the left knee, he had steroid injection and Synvisc with limited  relief,  left genicular nerve block provided him with no relief, I have attempt baker cyst aspiration with no success, I would recommend seeing orthopedic provider to discuss surgical treatment    Encounter Diagnosis:  John Kruse is a 74 y.o. male with the following diagnoses based on history, exam, and imagin. Bilateral chronic knee pain  - Ambulatory referral/consult to Orthopedics; Future    2. Synovial cyst of left popliteal space  - Ambulatory referral/consult to Orthopedics; Future  - Ambulatory referral/consult to Orthopedics; Future    3. Chronic bilateral low back pain without sciatica    4. Lumbar facet arthropathy    Treatment Plan:    Diagnostics/Referrals:  ortho referral    Medications:    NSAIDs: OTC  Topical Agent: Yes  TCA/SSRI/SNRI: None  Anti-convulsants: None  Muscle Relaxants: None  Opioids: None    Interventional Therapy: NA    Physical Rehabilitation: Physician led exercise program and home exercise    Patient Education: Counseled patient regarding the importance of activity modification, I have stressed the importance of physical activity and a home exercise plan to help with pain and improve health.    Follow-up: RTC  as needed    May consider: BOBY Madrigal MD  Anesthesiologist  Interventional Pain Medicine  2025    Disclaimer:  This note was prepared using voice recognition system and is likely to have sound alike errors that may have been overlooked even after proof reading.  Please call me with any questions.

## 2025-04-24 NOTE — PROGRESS NOTES
New Patient Evaluation  [facility]    John Kruse  : 1950  Date: 2025     CHIEF COMPLAINT:  No chief complaint on file.    Referring Physician: No ref. provider found  Primary Care Physician: Jannette Rojas NP    HPI:  This is a 74 y.o. male with a chief complaint of No chief complaint on file.  . The patient has Past medical history/Past surgical history of  hypertension, hyperlipidemia, peripheral artery disease, obstructive sleep apnea    Patient was evaluated and referred by Neurosurgery provider Cathy Mckenna MD  for low back pain and right lower extremity radiculitis.  MRI lumbar spine in  showed at L4-L5 disc protrusion with  mild-to-moderate lateral recess stenosis.  At L5-S1 right posterolateral disc bulge causing moderate right and mild left neural foramina narrowing.   Today he would like to address his knee pain that failed to respond to multiple steroid shot and  Synvisc.    Interval History 2025:    John Kruse is here for procedure follow up visit after right genicular nerve block,  patient reported *** % improvement in pain and functionality,  in addition to  left genicular nerve block,  patient reported *** % improvement in pain and functionality.  Current pain score: ***/10      Diabetic: No    Anticoagulation medications: None    Allergy To Iodine: No    Currently on Antibiotic: No    Current Description of Pain Symptoms:    History of Recent Fall or Trauma: No   Onset: Chronic, started 10 year ago   Pain Location: lower back   Radiates/associated symptoms: BL LE to the feet, in addition to bilateral knee pain.  Pain is Getting worse over the last 3 months    The pain is described as aching, numbing, and pulling .   Exacerbating factors: Standing and getting up from bed or a chair.   Mitigating factors N/A.   Symptoms interfere with daily activity, sleeping.   The patient feels like symptoms have been worsening.    Patient denies night fever/night sweats, urinary incontinence, bowel incontinence, significant weight loss, significant motor weakness, and loss of sensations.    Pain score:   Current: 2/10  Best: 2/10  Worst: 5/10    Current pain medications:  OTC    Current Narcotics/Opioid /benzo Medications:  Opioids- None  Benzodiazepines: No    UDS:  NA    PDMP:  Reviewed and consistent with medication use as prescribed.     Previous Chronic Pain Treatment History:  Six weeks of conservative therapy include: Physical Therapy/HEP/Physician Lead Exercise Program:  Over the past 12 months, Patient has done  0 sessions.    Is patient actively participating in home exercise program (HEP)/ physician led exercise program in the last 6 months: Yes.    Non-interventional Pain Therapy:  [x]Chiropractor 2024  []Acupuncture/Dry needle.  []TENS unit.  [x]Heat/ICE.  []Back Brace.    Medications previously tried:  NSAIDs: OTC, Ibuprofen (Advil/Motrin), Meloxicam (Mobic), and Naproxen (Naprosyn)  Topical Agent: Yes  TCA/SSRI/SNRI: None  Anti-convulsants: None  Muscle Relaxants: Flexeril (Cyclobenzaprine) and Baclofen   Opioids- Oxycodone with Acetaminophen (Percocet) and Hydrocodone with Acetaminophen (Norco).    Interventional Pain Procedures:  Knee Injections: CSI and Synvisc ( eft knee)    Previous spine/Relevant joint surgery:  N/A  Surgical History:   has a past surgical history that includes Colonoscopy; Mouth surgery; Laser of prostate w/ green light pvp (07/2015); yag laser; and block, nerve, genicular (Bilateral, 3/28/2025).  Medical History:   has a past medical history of Back pain, BPH (benign prostatic hypertrophy) with urinary obstruction (06/26/2015), Cataract, Enlargement (benign) of prostate, Hernia, hiatal, High cholesterol, Numbness in right leg, Renal cyst, Ruptured disk, and Urinary incontinence (07/2015).  Family History:  family history includes Hypertension in his father and mother; No Known Problems in his maternal  grandfather, maternal grandmother, paternal grandfather, paternal grandmother, and sister.  Allergies:  Patient has no known allergies.   Social History/SUBSTANCE ABUSE HISTORY:  Personal history of substance abuse: No   reports that he quit smoking about 3 years ago. His smoking use included cigarettes. He started smoking about 33 years ago. He has a 7.5 pack-year smoking history. He has quit using smokeless tobacco. He reports current alcohol use of about 27.0 standard drinks of alcohol per week. He reports that he does not use drugs.  LABS:  CBC  Lab Results   Component Value Date    WBC 6.16 08/14/2023    HGB 15.5 08/14/2023    HCT 46.7 08/14/2023     Coagulation Profile   Lab Results   Component Value Date     08/14/2023       Lab Results   Component Value Date    INR 1.0 06/03/2021     CMP:  BMP  Lab Results   Component Value Date     08/14/2023    K 4.9 08/14/2023     08/14/2023    CO2 23 08/14/2023    BUN 13 08/14/2023    CREATININE 0.8 08/14/2023    CALCIUM 10.4 08/14/2023    ANIONGAP 9 08/14/2023    EGFRNORACEVR >60.0 08/14/2023     Lab Results   Component Value Date    ALT 27 08/14/2023    AST 22 08/14/2023    ALKPHOS 89 08/14/2023    BILITOT 0.6 08/14/2023     HGBA1C:  Lab Results   Component Value Date    HGBA1C 5.3 08/14/2023       ROS:    Review of Systems   GENERAL:  No weight loss, malaise or fevers.  HEENT:   No recent changes in vision or hearing  NECK:  Negative for lumps, no difficulty with swallowing.  RESPIRATORY:  Negative for cough, wheezing or shortness of breath, patient denies any recent URI.  CARDIOVASCULAR:  Negative for chest pain or palpitations.  GI:  Negative for abdominal discomfort, blood in stools or black stools or change in bowel habits.  MUSCULOSKELETAL:  See HPI.  SKIN:  Negative for lesions, rash, and itching.  PSYCH:  No mood disorder or recent psychosocial stressors.   HEMATOLOGY/LYMPHOLOGY:  See the blood thinner sectioned in HPI.  NEURO:  See HPI  All  other reviewed and negative other than HPI.    PHYSICAL EXAM:  VITALS: There were no vitals taken for this visit.  There is no height or weight on file to calculate BMI.  GENERAL: Well appearing, in no acute distress, alert and oriented x3, answers questions appropriately.   PSYCH: Flat affect.  SKIN: Skin color, texture, turgor normal, no rashes or lesions.  HEAD/FACE:  Normocephalic, atraumatic. Cranial nerves grossly intact.  CV: Regular rate  PULM: No evidence of respiratory difficulty, symmetric chest rise.  GI:  Soft and non-Distended.  BACK/SIJ/HIP:  Lumbar Spine Exam:       Inspection: No erythema, bruising.       Palpation: (++) TTP of lumbar paraspinals bilaterally      ROM:  Limited in flexion, extension, lateral bending.   Knee exam:  No gross deformity or apparent leg length discrepancy, no tenderness over the anteromedial aspect of the knee cap, no limitation due to pain in flexion and extension, sensation  grossly intact to light touch in bilateral lower extremity, no atrophy or tone abnormalities    GAIT:  normal gait    DIAGNOSTIC STUDIES AND MEDICAL RECORDS REVIEW:  I have personally reviewed and interpreted relevant radiology reports and reviewed relevant records from other services in the EMR.     Anatomic lumbar alignment.  Desiccation L1-2 through L5-S1 discs.  L2-3, mild annular bulging.    L3-4, mild annular bulging.    L4-5, posterior midline disc protrusion with mild to moderate deformity adjacent ventral thecal sac and with underlying mild bulging of the disc.    L5-S1, bulging of the disc asymmetric to the right posterolaterally and laterally with secondary mild to moderate right and mild left foraminal narrowing.  Mild left facet arthropathy.    No disc herniation, canal stenosis or foraminal compromise remaining lumbar or visualized lower most thoracic disc levels.  The conus medullaris has a normal contour and signal.    Incidental vertebral hemangioma L4.  Marrow degenerative change  at several levels particularly noted L2-3.    Probable bilateral renal cysts incompletely included or evaluated on this exam.   Impression    Posterior midline disc protrusion L4-5 with mild to moderate impression on adjacent ventral thecal sac and underlying mild bulging of the disc.    Bulging asymmetric to the right posterolaterally and laterally L5-S1 disc with mild to moderate foraminal narrowing and mild left facet arthropathy.    Mild annular bulging L3-4.    Bilateral probable renal cyst incompletely included or evaluated on this exam.    -  x-ray bilateral knee:    Right: No fracture or dislocation.  No joint effusion.  Cartilage spaces are maintained.  Minimal dorsal spurring from the patella.     Left: No fracture or dislocation.  No joint effusion.  Medial compartment joint space narrowing with subchondral sclerosis.  Dorsal spurring the patella.  Clinical Impression:  This is a pleasant 74 y.o. male patient with PMH/PSH of hypertension, hyperlipidemia, peripheral artery disease, obstructive sleep apnea, presenting with back pain and bilateral knee pain, left worse than the right, he was referred from Neurosurgery to address his back pain, patient is not candidate for surgery, he reported significant improvement back pain after taking omegaXL, he does not have significant back pain anymore, continues to have bilateral knee pain( worse with standing and walking no pain on sitting) worse on the left, x-ray bilateral knee show medial compartment joint space narrowing with a sclerosis of the left knee, he had steroid injection and Synvisc with limited relief, he is interested in proceeding with bilateral genicular nerve block and possible ablation.     Encounter Diagnosis:  John Kruse is a 74 y.o. male with the following diagnoses based on history, exam, and imaging:  There are no diagnoses linked to this encounter.     Treatment Plan:    Diagnostics/Referrals: X-ray bilateral  knee    Medications:    NSAIDs: OTC  Topical Agent: Yes  TCA/SSRI/SNRI: None  Anti-convulsants: None  Muscle Relaxants: None  Opioids: None    Interventional Therapy: Please schedule for  Bilateral Genicular nerve block.   Sedation: IV Sedation.  Anticoagulation medications: None -Clearance to stop Blood thinner: NA    Regarding the above interventions, the patient has been educated regarding the risks (including bleeding, infection, increased pain, nerve damage, or allergic reaction), benefits, and alternatives. The patient states he understands and is eager to proceed.    Physical Rehabilitation: Physician led exercise program and home exercise    Patient Education: Counseled patient regarding the importance of activity modification, I have stressed the importance of physical activity and a home exercise plan to help with pain and improve health.    Follow-up: RTC  after ablation.    May consider: NA    I would like to thank No ref. provider found for the opportunity to assist in the care of this patient. We had a very nice visit and I look forward to continuing their care. Please let me know if I can be of further assistance.     Shakira Madrigal MD  Anesthesiologist  Interventional Pain Medicine  04/24/2025    Disclaimer:  This note was prepared using voice recognition system and is likely to have sound alike errors that may have been overlooked even after proof reading.  Please call me with any questions.

## 2025-05-15 ENCOUNTER — HOSPITAL ENCOUNTER (EMERGENCY)
Facility: HOSPITAL | Age: 75
Discharge: HOME | End: 2025-05-15
Attending: SURGERY
Payer: MEDICARE

## 2025-05-15 VITALS
TEMPERATURE: 98 F | DIASTOLIC BLOOD PRESSURE: 81 MMHG | WEIGHT: 257.94 LBS | RESPIRATION RATE: 18 BRPM | OXYGEN SATURATION: 95 % | HEART RATE: 86 BPM | SYSTOLIC BLOOD PRESSURE: 177 MMHG | BODY MASS INDEX: 36.93 KG/M2 | HEIGHT: 70 IN

## 2025-05-15 DIAGNOSIS — G89.29 CHRONIC PAIN OF LEFT KNEE: Primary | ICD-10-CM

## 2025-05-15 DIAGNOSIS — M25.562 CHRONIC PAIN OF LEFT KNEE: Primary | ICD-10-CM

## 2025-05-15 PROCEDURE — 96372 THER/PROPH/DIAG INJ SC/IM: CPT | Performed by: NURSE PRACTITIONER

## 2025-05-15 PROCEDURE — 99284 EMERGENCY DEPT VISIT MOD MDM: CPT | Mod: 25

## 2025-05-15 PROCEDURE — 63600175 PHARM REV CODE 636 W HCPCS: Performed by: NURSE PRACTITIONER

## 2025-05-15 RX ORDER — DEXAMETHASONE SODIUM PHOSPHATE 4 MG/ML
8 INJECTION, SOLUTION INTRA-ARTICULAR; INTRALESIONAL; INTRAMUSCULAR; INTRAVENOUS; SOFT TISSUE
Status: COMPLETED | OUTPATIENT
Start: 2025-05-15 | End: 2025-05-15

## 2025-05-15 RX ORDER — PREDNISONE 20 MG/1
40 TABLET ORAL DAILY
Qty: 10 TABLET | Refills: 0 | Status: SHIPPED | OUTPATIENT
Start: 2025-05-15 | End: 2025-05-20

## 2025-05-15 RX ADMIN — DEXAMETHASONE SODIUM PHOSPHATE 8 MG: 4 INJECTION, SOLUTION INTRA-ARTICULAR; INTRALESIONAL; INTRAMUSCULAR; INTRAVENOUS; SOFT TISSUE at 09:05

## 2025-05-15 NOTE — ED PROVIDER NOTES
"Encounter Date: 5/15/2025       History     Chief Complaint   Patient presents with    Knee Pain     Pt to ED due to chronic Knee pain. States "I need a Cortisone shot again for this pain".     Chief complaint: Knee pain   [age] male with a history of back pain BPH high cholesterol presents to be evaluated for chronic knee pain.  Reports he has had longstanding issues and use to have a knee replacement but has been unable to see Orthopedics.  Reports that he crushes gross with a push mower yesterday and now has exacerbation of his left knee pain that is usual.  No warmth no crepitus no erythema.  Currently states he has 10/10 deep aching pain.  Requesting a steroid shot.      Review of patient's allergies indicates:  No Known Allergies  Past Medical History:   Diagnosis Date    Back pain     BPH (benign prostatic hypertrophy) with urinary obstruction 06/26/2015    Cataract     Enlargement (benign) of prostate     Hernia, hiatal     High cholesterol     Numbness in right leg     Renal cyst     Ruptured disk     Urinary incontinence 07/2015     Past Surgical History:   Procedure Laterality Date    BLOCK, NERVE, GENICULAR Bilateral 3/28/2025    Procedure: GENICULAR NERVE BLOCK;  Surgeon: Shakira Madrigal MD;  Location: [facility] OR;  Service: Pain Management;  Laterality: Bilateral;    COLONOSCOPY      LASER OF PROSTATE W/ GREEN LIGHT PVP  07/2015    MOUTH SURGERY      yag laser       Family History   Problem Relation Name Age of Onset    Hypertension Mother      Hypertension Father      No Known Problems Sister      No Known Problems Paternal Grandmother      No Known Problems Paternal Grandfather      No Known Problems Maternal Grandmother      No Known Problems Maternal Grandfather       Social History[1]  Review of Systems   Musculoskeletal:  Positive for arthralgias. Negative for gait problem and myalgias.   Skin:  Negative for color change and wound.   Neurological:  Negative for weakness and numbness. "       Physical Exam     Initial Vitals [05/15/25 0929]   BP Pulse Resp Temp SpO2   (!) 177/81 86 18 97.9 °F (36.6 °C) 95 %      MAP       --         Physical Exam    Nursing note and vitals reviewed.  Constitutional: He appears well-developed and well-nourished.   HENT:   Head: Normocephalic and atraumatic.   Eyes: EOM are normal. Pupils are equal, round, and reactive to light.   Cardiovascular:  Normal rate and regular rhythm.           Pulmonary/Chest: Breath sounds normal.   Musculoskeletal:         General: No tenderness or edema. Normal range of motion.     Neurological: He is alert and oriented to person, place, and time. He has normal strength and normal reflexes.   Skin: Skin is warm and dry.         ED Course   Procedures  Labs Reviewed - No data to display       Imaging Results    None          Medications   dexAMETHasone injection 8 mg (has no administration in time range)     Medical Decision Making  74-year-old male with chronic knee pain presents to be evaluated for exacerbation of chronic pain after cutting his grass with a push mower   Differential diagnoses include chronic pain, bursitis, ligamentous injury, fracture, arthritis    Amount and/or Complexity of Data Reviewed  Discussion of management or test interpretation with external provider(s): Patient with full range of motion of the left lower extremity   No warmth no crepitus no erythema noted   Patient declining any imaging requesting steroid shot only   Give Decadron with orthopedic referral   Stable for DC at this time    Risk  Prescription drug management.                                      Clinical Impression:  Final diagnoses:  [M25.562, G89.29] Chronic pain of left knee (Primary)          ED Disposition Condition    Discharge Stable          ED Prescriptions       Medication Sig Dispense Start Date End Date Auth. Provider    predniSONE (DELTASONE) 20 MG tablet Take 2 tablets (40 mg total) by mouth once daily. for 5 days 10 tablet  5/15/2025 5/20/2025 Pina Hernandez NP          Follow-up Information    None                [1]   Social History  Tobacco Use    Smoking status: Former     Current packs/day: 0.00     Average packs/day: 0.3 packs/day for 30.0 years (7.5 ttl pk-yrs)     Types: Cigarettes     Start date: 8/6/1991     Quit date: 8/6/2021     Years since quitting: 3.7    Smokeless tobacco: Former    Tobacco comments:     Pt states he is interested in quitting because of cost   Substance Use Topics    Alcohol use: Yes     Alcohol/week: 27.0 standard drinks of alcohol     Types: 27 Cans of beer per week    Drug use: No        Pina Hernandez NP  05/15/25 0937

## (undated) DEVICE — KIT NERVE BLOCK PREP BAPTIST

## (undated) DEVICE — MARKER SKIN RULER AND LABEL

## (undated) DEVICE — CHLORAPREP 10.5 ML APPLICATOR